# Patient Record
Sex: MALE | Race: WHITE | Employment: OTHER | URBAN - METROPOLITAN AREA
[De-identification: names, ages, dates, MRNs, and addresses within clinical notes are randomized per-mention and may not be internally consistent; named-entity substitution may affect disease eponyms.]

---

## 2017-01-02 ENCOUNTER — HOSPITAL ENCOUNTER (OUTPATIENT)
Dept: LAB | Age: 72
Discharge: HOME | End: 2017-01-02
Attending: INTERNAL MEDICINE | Admitting: INTERNAL MEDICINE

## 2017-01-02 LAB
ANION GAP SERPL CALCULATED.3IONS-SCNC: 15 MMOL/L (ref 4–16)
BUN BLDV-MCNC: 25 MG/DL (ref 6–23)
CALCIUM SERPL-MCNC: 9.6 MG/DL (ref 8.3–10.6)
CHLORIDE BLD-SCNC: 96 MMOL/L (ref 99–110)
CO2: 27 MMOL/L (ref 21–32)
CREAT SERPL-MCNC: 0.9 MG/DL (ref 0.9–1.3)
ESTIMATED AVERAGE GLUCOSE: 171 MG/DL
GFR AFRICAN AMERICAN: >60 ML/MIN/1.73M2
GFR NON-AFRICAN AMERICAN: >60 ML/MIN/1.73M2
GLUCOSE BLD-MCNC: 196 MG/DL (ref 70–140)
HBA1C MFR BLD: 7.6 % (ref 4.2–6.3)
POTASSIUM SERPL-SCNC: 4.3 MMOL/L (ref 3.5–5.1)
SODIUM BLD-SCNC: 138 MMOL/L (ref 135–145)

## 2017-03-27 ENCOUNTER — HOSPITAL ENCOUNTER (OUTPATIENT)
Dept: GENERAL RADIOLOGY | Age: 72
Discharge: HOME | End: 2017-03-27
Attending: INTERNAL MEDICINE | Admitting: INTERNAL MEDICINE

## 2017-03-27 DIAGNOSIS — R13.10 DYSPHAGIA, UNSPECIFIED TYPE: ICD-10-CM

## 2017-04-03 ENCOUNTER — HOSPITAL ENCOUNTER (OUTPATIENT)
Dept: GENERAL RADIOLOGY | Age: 72
Discharge: HOME | End: 2017-04-03
Attending: INTERNAL MEDICINE | Admitting: INTERNAL MEDICINE

## 2017-04-03 LAB
ALBUMIN SERPL-MCNC: 4.6 GM/DL (ref 3.4–5)
ALP BLD-CCNC: 66 IU/L (ref 40–128)
ALT SERPL-CCNC: 59 U/L (ref 10–40)
ANION GAP SERPL CALCULATED.3IONS-SCNC: 13 MMOL/L (ref 4–16)
AST SERPL-CCNC: 28 IU/L (ref 15–37)
BASOPHILS ABSOLUTE: 0.1 K/CU MM
BASOPHILS RELATIVE PERCENT: 1.8 % (ref 0–1)
BILIRUB SERPL-MCNC: 0.8 MG/DL (ref 0–1)
BUN BLDV-MCNC: 18 MG/DL (ref 6–23)
CALCIUM SERPL-MCNC: 10 MG/DL (ref 8.3–10.6)
CHLORIDE BLD-SCNC: 101 MMOL/L (ref 99–110)
CHOLESTEROL: 127 MG/DL
CO2: 27 MMOL/L (ref 21–32)
CREAT SERPL-MCNC: 1 MG/DL (ref 0.9–1.3)
DIFFERENTIAL TYPE: ABNORMAL
EOSINOPHILS ABSOLUTE: 0.5 K/CU MM
EOSINOPHILS RELATIVE PERCENT: 7.7 % (ref 0–3)
ESTIMATED AVERAGE GLUCOSE: 148 MG/DL
GFR AFRICAN AMERICAN: >60 ML/MIN/1.73M2
GFR NON-AFRICAN AMERICAN: >60 ML/MIN/1.73M2
GLUCOSE BLD-MCNC: 135 MG/DL (ref 70–140)
HBA1C MFR BLD: 6.8 % (ref 4.2–6.3)
HCT VFR BLD CALC: 54 % (ref 42–52)
HDLC SERPL-MCNC: 33 MG/DL
HEMOGLOBIN: 18 GM/DL (ref 13.5–18)
IMMATURE NEUTROPHIL %: 0.3 % (ref 0–0.43)
LDL CHOLESTEROL DIRECT: 72 MG/DL
LYMPHOCYTES ABSOLUTE: 1.5 K/CU MM
LYMPHOCYTES RELATIVE PERCENT: 22.2 % (ref 24–44)
MCH RBC QN AUTO: 33.2 PG (ref 27–31)
MCHC RBC AUTO-ENTMCNC: 33.3 % (ref 32–36)
MCV RBC AUTO: 99.6 FL (ref 78–100)
MONOCYTES ABSOLUTE: 0.6 K/CU MM
MONOCYTES RELATIVE PERCENT: 8.5 % (ref 0–4)
NUCLEATED RBC %: 0 %
PDW BLD-RTO: 13.2 % (ref 11.7–14.9)
PLATELET # BLD: 237 K/CU MM (ref 140–440)
PMV BLD AUTO: 10.2 FL (ref 7.5–11.1)
POTASSIUM SERPL-SCNC: 4.9 MMOL/L (ref 3.5–5.1)
RBC # BLD: 5.42 M/CU MM (ref 4.6–6.2)
SEGMENTED NEUTROPHILS ABSOLUTE COUNT: 4.1 K/CU MM
SEGMENTED NEUTROPHILS RELATIVE PERCENT: 59.5 % (ref 36–66)
SODIUM BLD-SCNC: 141 MMOL/L (ref 135–145)
TOTAL IMMATURE NEUTOROPHIL: 0.02 K/CU MM
TOTAL NUCLEATED RBC: 0 K/CU MM
TOTAL PROTEIN: 7.2 GM/DL (ref 6.4–8.2)
TRIGL SERPL-MCNC: 176 MG/DL
WBC # BLD: 6.8 K/CU MM (ref 4–10.5)

## 2017-05-03 ENCOUNTER — HOSPITAL ENCOUNTER (OUTPATIENT)
Dept: SURGERY | Age: 72
Discharge: HOME | End: 2017-05-03
Attending: INTERNAL MEDICINE | Admitting: INTERNAL MEDICINE

## 2017-05-03 VITALS
RESPIRATION RATE: 16 BRPM | WEIGHT: 207 LBS | TEMPERATURE: 98.3 F | DIASTOLIC BLOOD PRESSURE: 79 MMHG | HEIGHT: 68 IN | BODY MASS INDEX: 31.37 KG/M2 | OXYGEN SATURATION: 95 % | SYSTOLIC BLOOD PRESSURE: 127 MMHG | HEART RATE: 86 BPM

## 2017-05-03 LAB — GLUCOSE BLD-MCNC: 129 MG/DL (ref 70–99)

## 2017-05-03 RX ORDER — SODIUM CHLORIDE, SODIUM LACTATE, POTASSIUM CHLORIDE, CALCIUM CHLORIDE 600; 310; 30; 20 MG/100ML; MG/100ML; MG/100ML; MG/100ML
INJECTION, SOLUTION INTRAVENOUS CONTINUOUS
Status: DISCONTINUED | OUTPATIENT
Start: 2017-05-03 | End: 2017-05-04 | Stop reason: HOSPADM

## 2017-05-03 RX ADMIN — SODIUM CHLORIDE, SODIUM LACTATE, POTASSIUM CHLORIDE, CALCIUM CHLORIDE: 600; 310; 30; 20 INJECTION, SOLUTION INTRAVENOUS at 10:14

## 2017-05-03 ASSESSMENT — PAIN SCALES - GENERAL
PAINLEVEL_OUTOF10: 0
PAINLEVEL_OUTOF10: 0

## 2017-05-03 ASSESSMENT — PAIN - FUNCTIONAL ASSESSMENT: PAIN_FUNCTIONAL_ASSESSMENT: 0-10

## 2017-09-29 ENCOUNTER — HOSPITAL ENCOUNTER (OUTPATIENT)
Dept: GENERAL RADIOLOGY | Age: 72
Discharge: HOME | End: 2017-09-29
Attending: INTERNAL MEDICINE | Admitting: INTERNAL MEDICINE

## 2017-09-29 LAB
ALBUMIN SERPL-MCNC: 4.5 GM/DL (ref 3.4–5)
ALP BLD-CCNC: 78 IU/L (ref 40–128)
ALT SERPL-CCNC: 74 U/L (ref 10–40)
ANION GAP SERPL CALCULATED.3IONS-SCNC: 15 MMOL/L (ref 4–16)
AST SERPL-CCNC: 42 IU/L (ref 15–37)
BILIRUB SERPL-MCNC: 0.8 MG/DL (ref 0–1)
BUN BLDV-MCNC: 19 MG/DL (ref 6–23)
CALCIUM SERPL-MCNC: 9.7 MG/DL (ref 8.3–10.6)
CHLORIDE BLD-SCNC: 99 MMOL/L (ref 99–110)
CHOLESTEROL: 131 MG/DL
CO2: 25 MMOL/L (ref 21–32)
CREAT SERPL-MCNC: 1 MG/DL (ref 0.9–1.3)
ESTIMATED AVERAGE GLUCOSE: 148 MG/DL
GFR AFRICAN AMERICAN: >60 ML/MIN/1.73M2
GFR NON-AFRICAN AMERICAN: >60 ML/MIN/1.73M2
GLUCOSE FASTING: 142 MG/DL (ref 70–99)
HBA1C MFR BLD: 6.8 % (ref 4.2–6.3)
HDLC SERPL-MCNC: 32 MG/DL
LDL CHOLESTEROL DIRECT: 72 MG/DL
POTASSIUM SERPL-SCNC: 4.6 MMOL/L (ref 3.5–5.1)
PROSTATE SPECIFIC ANTIGEN: 0.93 NG/ML (ref 0–4)
SODIUM BLD-SCNC: 139 MMOL/L (ref 135–145)
TOTAL PROTEIN: 7.2 GM/DL (ref 6.4–8.2)
TRIGL SERPL-MCNC: 168 MG/DL
TSH HIGH SENSITIVITY: 1.98 UIU/ML (ref 0.27–4.2)

## 2017-10-09 ENCOUNTER — TELEPHONE (OUTPATIENT)
Dept: CARDIOLOGY CLINIC | Age: 72
End: 2017-10-09

## 2017-10-09 DIAGNOSIS — R07.9 CHEST PAIN, UNSPECIFIED TYPE: Primary | ICD-10-CM

## 2017-10-09 NOTE — TELEPHONE ENCOUNTER
Orders placed per CPACS for Cardiolite for chest pain ordered  by Dr Delacruz Poles  see orders scanned in media.

## 2017-10-11 ENCOUNTER — PROCEDURE VISIT (OUTPATIENT)
Dept: CARDIOLOGY CLINIC | Age: 72
End: 2017-10-11

## 2017-10-11 DIAGNOSIS — R07.9 CHEST PAIN, UNSPECIFIED TYPE: ICD-10-CM

## 2017-10-11 LAB
LV EF: 67 %
LVEF MODALITY: NORMAL

## 2017-10-11 PROCEDURE — 93015 CV STRESS TEST SUPVJ I&R: CPT | Performed by: INTERNAL MEDICINE

## 2017-10-11 PROCEDURE — 78452 HT MUSCLE IMAGE SPECT MULT: CPT | Performed by: INTERNAL MEDICINE

## 2017-10-11 PROCEDURE — A9500 TC99M SESTAMIBI: HCPCS | Performed by: INTERNAL MEDICINE

## 2017-11-09 PROBLEM — R79.89 ELEVATED TROPONIN: Status: ACTIVE | Noted: 2017-11-09

## 2017-11-09 PROBLEM — D75.1 ERYTHROCYTOSIS: Status: ACTIVE | Noted: 2017-11-09

## 2017-11-09 PROBLEM — I10 HYPERTENSION: Status: ACTIVE | Noted: 2017-11-09

## 2017-11-09 PROBLEM — R77.8 ELEVATED TROPONIN: Status: ACTIVE | Noted: 2017-11-09

## 2017-11-10 PROBLEM — I24.0 OCCLUSION OF LAD (LEFT ANTERIOR DESCENDING) ARTERY (HCC): Status: ACTIVE | Noted: 2017-11-10

## 2017-11-20 ENCOUNTER — OFFICE VISIT (OUTPATIENT)
Dept: CARDIOLOGY CLINIC | Age: 72
End: 2017-11-20

## 2017-11-20 VITALS
HEIGHT: 68 IN | SYSTOLIC BLOOD PRESSURE: 110 MMHG | HEART RATE: 74 BPM | BODY MASS INDEX: 31.46 KG/M2 | WEIGHT: 207.6 LBS | DIASTOLIC BLOOD PRESSURE: 60 MMHG

## 2017-11-20 DIAGNOSIS — R07.9 CHEST PAIN, UNSPECIFIED TYPE: Primary | ICD-10-CM

## 2017-11-20 DIAGNOSIS — Z98.61 S/P PTCA (PERCUTANEOUS TRANSLUMINAL CORONARY ANGIOPLASTY): ICD-10-CM

## 2017-11-20 PROCEDURE — 99214 OFFICE O/P EST MOD 30 MIN: CPT | Performed by: INTERNAL MEDICINE

## 2017-11-20 PROCEDURE — 93000 ELECTROCARDIOGRAM COMPLETE: CPT | Performed by: INTERNAL MEDICINE

## 2017-11-20 RX ORDER — LEVOTHYROXINE SODIUM 100 UG/1
CAPSULE ORAL DAILY
COMMUNITY
End: 2020-04-20

## 2017-11-20 NOTE — PROGRESS NOTES
Radha Gomez MD        OFFICE  FOLLOWUP NOTE    Chief complaints: patient is here for management of CAD, DM, HTN, DYSLPIDEMIA    Subjective: patient feels better, no chest pain, no shortness of breath, no dizziness, no palpitations    HPI Tania Huerta is a 80 y. o.year old who  has a past medical history of Diabetes mellitus ([facility] [address] Hypertension; and 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 ([facility] [address] and presents for management of CAD, DM, HTN, DYSLPIDEMIA, which are well controlled, he had NSTEMI ( IT %mid LAD, S/P URGENT PCI of LAD),       Current Outpatient Prescriptions   Medication Sig Dispense Refill    linagliptin (TRADJENTA) 5 MG tablet Take 5 mg by mouth daily      Levothyroxine Sodium 100 MCG CAPS Take by mouth Daily      canagliflozin (INVOKANA) 300 MG TABS tablet Take 300 mg by mouth every morning (before breakfast)      aspirin 325 MG tablet Take 1 tablet by mouth daily 30 tablet 3    metoprolol succinate (TOPROL XL) 25 MG extended release tablet Take 1 tablet by mouth daily 30 tablet 3    prasugrel (EFFIENT) 10 MG TABS Take 1 tablet by mouth daily 30 tablet 3    atorvastatin (LIPITOR) 80 MG tablet Take 1 tablet by mouth daily 30 tablet 3    glimepiride (AMARYL) 4 MG tablet Take 4 mg by mouth every morning (before breakfast).  lisinopril (PRINIVIL;ZESTRIL) 5 MG tablet Take 5 mg by mouth daily.  METFORMIN HCL PO Take 1,000 mg by mouth 2 times daily. No current facility-administered medications for this visit. Allergies: Review of patient's allergies indicates no known allergies.   Past Medical History:   Diagnosis Date    Diabetes mellitus ([facility] [address]     Hypertension     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 11/10/2017     Past Surgical History:   Procedure Laterality Date    BACK SURGERY      X 2    COLONOSCOPY  4/3/14    divertics, rectal polyps    ENDOSCOPY, COLON, DIAGNOSTIC      ENDOSCOPY, COLON, DIAGNOSTIC  05/03/2017 No wheezing, No chest tenderness. ,no use of accessory muscles, diaphragm movement is normal  Cardiovascular: (PMI) apex non displaced,no lifts no thrills, no s3,no s4, Normal heart rate, Normal rhythm, No murmurs, No rubs, No gallops. Carotid arteries pulse and amplitude are normal no bruit, no abdominal bruit noted ( normal abdominal aorta ausculation), femoral arteries pulse and amplitude are normal no bruit, pedal pulses are normal  Femoral pulses have normal amplitude, no bruits   Extremities - No cyanosis, clubbing, or significant edema, no varicose veins    Abdomen  No masses, tenderness, or organomegaly, no hepato-splenomegally, no bruits  Musculoskeletal  No significant edema, no kyphosis or scoliosis, no deformity in any extremity noted, muscle strength and tone are normal  Skin: no ulcer,no scar,no stasis dermatitis   Neurologic  alert oriented times 3,Cranial nerves II through XII are grossly intact. There were no gross focal neurologic abnormalities. All sensory and motor nerves examined and were normal  Psychiatric: normal mood and affect    Lab Results   Component Value Date    TROPONINI <0.006 02/12/2014     BNP:  No results found for: BNP  PT/INR:  No results found for: [employer]  Lab Results   Component Value Date    LABA1C 7.0 (H) 11/10/2017    LABA1C 6.8 (H) 09/29/2017     Lab Results   Component Value Date    CHOL 131 11/10/2017    TRIG 258 (H) 11/10/2017    HDL 29 (L) 11/10/2017    LDLDIRECT 72 11/10/2017     Lab Results   Component Value Date    ALT 77 (H) 11/09/2017    AST 42 (H) 11/09/2017     TSH:  No results found for: TSH    Impression:  Sonia Sequeira is a 80 y. o.year old who  has a past medical history of Diabetes mellitus ([facility] [address] Hypertension; and 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 ([facility] [address] and presents with     Plan:  1. CAD: Continue aspirin and EFFEINT, continue statins, ACEinhibitors, betablockers  2. S/P PCI OF LAD, start cardiac rehab program , echo and stress test  3.  DM: stable continue metformin  4. Dyslipidemia: continue statins  5. HTN: stable, continue lopressor and lisinopril medicatons  6. Health maintenance: exerise and diet  All labs, medications and tests reviewed, continue all other medications of all above medical condition listed as is.     @[facility]@

## 2017-11-21 ENCOUNTER — TELEPHONE (OUTPATIENT)
Dept: CARDIOLOGY CLINIC | Age: 72
End: 2017-11-21

## 2017-11-30 ENCOUNTER — PROCEDURE VISIT (OUTPATIENT)
Dept: CARDIOLOGY CLINIC | Age: 72
End: 2017-11-30

## 2017-11-30 DIAGNOSIS — Z98.61 S/P PTCA (PERCUTANEOUS TRANSLUMINAL CORONARY ANGIOPLASTY): ICD-10-CM

## 2017-11-30 DIAGNOSIS — R07.9 CHEST PAIN, UNSPECIFIED TYPE: ICD-10-CM

## 2017-11-30 DIAGNOSIS — Z98.61 S/P PTCA (PERCUTANEOUS TRANSLUMINAL CORONARY ANGIOPLASTY): Primary | ICD-10-CM

## 2017-11-30 DIAGNOSIS — R07.9 CHEST PAIN, UNSPECIFIED TYPE: Primary | ICD-10-CM

## 2017-11-30 LAB
LV EF: 55 %
LVEF MODALITY: NORMAL

## 2017-11-30 PROCEDURE — 93015 CV STRESS TEST SUPVJ I&R: CPT | Performed by: INTERNAL MEDICINE

## 2017-11-30 PROCEDURE — 93306 TTE W/DOPPLER COMPLETE: CPT | Performed by: INTERNAL MEDICINE

## 2017-12-01 ENCOUNTER — TELEPHONE (OUTPATIENT)
Dept: CARDIOLOGY CLINIC | Age: 72
End: 2017-12-01

## 2017-12-01 NOTE — TELEPHONE ENCOUNTER
Normal left ventricle structure and systolic function. Ejection fraction is   visually estimated at 50-60%. Grade I diastolic dysfunction.   No significant valvular disease noted.   No evidence of pericardial effusion.   Essentially unremarkable echo.   Advised pt of normal echo.

## 2017-12-15 ENCOUNTER — HOSPITAL ENCOUNTER (OUTPATIENT)
Dept: OTHER | Age: 72
Discharge: HOME | End: 2017-12-31
Attending: INTERNAL MEDICINE | Admitting: INTERNAL MEDICINE

## 2017-12-26 LAB
GLUCOSE BLD-MCNC: 143 MG/DL (ref 70–99)
GLUCOSE BLD-MCNC: 176 MG/DL (ref 70–99)

## 2017-12-28 LAB
GLUCOSE BLD-MCNC: 129 MG/DL (ref 70–99)
GLUCOSE BLD-MCNC: 162 MG/DL (ref 70–99)

## 2018-01-01 ENCOUNTER — HOSPITAL ENCOUNTER (OUTPATIENT)
Dept: OTHER | Age: 73
Discharge: HOME | End: 2018-01-31
Attending: INTERNAL MEDICINE | Admitting: INTERNAL MEDICINE

## 2018-01-04 LAB
GLUCOSE BLD-MCNC: 149 MG/DL (ref 70–99)
GLUCOSE BLD-MCNC: 158 MG/DL (ref 70–99)

## 2018-02-01 ENCOUNTER — HOSPITAL ENCOUNTER (OUTPATIENT)
Dept: OTHER | Age: 73
Discharge: HOME | End: 2018-02-28
Attending: INTERNAL MEDICINE | Admitting: INTERNAL MEDICINE

## 2018-03-05 ENCOUNTER — OFFICE VISIT (OUTPATIENT)
Dept: CARDIOLOGY CLINIC | Age: 73
End: 2018-03-05

## 2018-03-05 VITALS
HEIGHT: 68 IN | BODY MASS INDEX: 31.61 KG/M2 | SYSTOLIC BLOOD PRESSURE: 112 MMHG | WEIGHT: 208.6 LBS | DIASTOLIC BLOOD PRESSURE: 70 MMHG | HEART RATE: 80 BPM

## 2018-03-05 DIAGNOSIS — I25.10 CORONARY ARTERY DISEASE INVOLVING NATIVE CORONARY ARTERY OF NATIVE HEART WITHOUT ANGINA PECTORIS: Primary | ICD-10-CM

## 2018-03-05 PROCEDURE — 99214 OFFICE O/P EST MOD 30 MIN: CPT | Performed by: INTERNAL MEDICINE

## 2018-03-05 RX ORDER — PRASUGREL 10 MG/1
10 TABLET, FILM COATED ORAL DAILY
Qty: 90 TABLET | Refills: 3 | Status: SHIPPED | OUTPATIENT
Start: 2018-03-05 | End: 2019-02-11 | Stop reason: SDUPTHER

## 2018-03-05 RX ORDER — LISINOPRIL 5 MG/1
5 TABLET ORAL DAILY
Qty: 90 TABLET | Refills: 3 | Status: SHIPPED | OUTPATIENT
Start: 2018-03-05

## 2018-03-05 RX ORDER — METOPROLOL SUCCINATE 25 MG/1
25 TABLET, EXTENDED RELEASE ORAL DAILY
Qty: 90 TABLET | Refills: 3 | Status: SHIPPED | OUTPATIENT
Start: 2018-03-05 | End: 2019-02-11 | Stop reason: SDUPTHER

## 2018-03-05 NOTE — PROGRESS NOTES
Supple. No jugular venous distention noted. No carotid bruits, no apical -carotid delay  Respiratory:  Normal breath sounds, No respiratory distress, No wheezing, No chest tenderness. ,no use of accessory muscles, diaphragm movement is normal  Cardiovascular: (PMI) apex non displaced,no lifts no thrills, no s3,no s4, Normal heart rate, Normal rhythm, No murmurs, No rubs, No gallops. Carotid arteries pulse and amplitude are normal no bruit, no abdominal bruit noted ( normal abdominal aorta ausculation), femoral arteries pulse and amplitude are normal no bruit, pedal pulses are normal  Femoral pulses have normal amplitude, no bruits   Extremities - No cyanosis, clubbing, or significant edema, no varicose veins    Abdomen  No masses, tenderness, or organomegaly, no hepato-splenomegally, no bruits  Musculoskeletal  No significant edema, no kyphosis or scoliosis, no deformity in any extremity noted, muscle strength and tone are normal  Skin: no ulcer,no scar,no stasis dermatitis   Neurologic  alert oriented times 3,Cranial nerves II through XII are grossly intact. There were no gross focal neurologic abnormalities. All sensory and motor nerves examined and were normal  Psychiatric: normal mood and affect    Lab Results   Component Value Date    TROPONINI <0.006 02/12/2014     BNP:  No results found for: BNP  PT/INR:  No results found for: [employer]  Lab Results   Component Value Date    LABA1C 7.0 (H) 11/10/2017    LABA1C 6.8 (H) 09/29/2017     Lab Results   Component Value Date    CHOL 131 11/10/2017    TRIG 258 (H) 11/10/2017    HDL 29 (L) 11/10/2017    LDLDIRECT 72 11/10/2017     Lab Results   Component Value Date    ALT 77 (H) 11/09/2017    AST 42 (H) 11/09/2017     TSH:  No results found for: TSH    Impression:  Phillip Peguero is a 71 y. o.year old who  has a past medical history of Diabetes mellitus ([address]  History of exercise stress test; Hx of Doppler echocardiogram; Hypertension; and 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 ([facility] [address] and presents with     Plan:  1. CAD: switch to baby aspirin and continue  EFFEINT, continue statins, ACEinhibitors, betablockers  2. S/P PCI OF LAD, completed part of  cardiac rehab program  3. DM: stable continue metformin  4. Dyslipidemia: continue statins  5. HTN: stable, continue lopressor and lisinopril  All labs, medications and tests reviewed, continue all other medications of all above medical condition listed as is.     [unfilled]

## 2018-04-12 PROBLEM — R77.8 ELEVATED TROPONIN: Status: RESOLVED | Noted: 2017-11-09 | Resolved: 2018-04-12

## 2018-04-12 PROBLEM — R79.89 ELEVATED TROPONIN: Status: RESOLVED | Noted: 2017-11-09 | Resolved: 2018-04-12

## 2018-07-09 ENCOUNTER — HOSPITAL ENCOUNTER (OUTPATIENT)
Dept: GENERAL RADIOLOGY | Age: 73
Discharge: HOME | End: 2018-07-09
Attending: INTERNAL MEDICINE | Admitting: INTERNAL MEDICINE

## 2018-07-09 LAB
ALBUMIN SERPL-MCNC: 4.7 GM/DL (ref 3.4–5)
ALP BLD-CCNC: 75 IU/L (ref 40–128)
ALT SERPL-CCNC: 74 U/L (ref 10–40)
ANION GAP SERPL CALCULATED.3IONS-SCNC: 21 MMOL/L (ref 4–16)
AST SERPL-CCNC: 47 IU/L (ref 15–37)
BILIRUB SERPL-MCNC: 0.8 MG/DL (ref 0–1)
BUN BLDV-MCNC: 16 MG/DL (ref 6–23)
CALCIUM SERPL-MCNC: 10.1 MG/DL (ref 8.3–10.6)
CHLORIDE BLD-SCNC: 101 MMOL/L (ref 99–110)
CHOLESTEROL: 99 MG/DL
CO2: 22 MMOL/L (ref 21–32)
CREAT SERPL-MCNC: 0.9 MG/DL (ref 0.9–1.3)
ESTIMATED AVERAGE GLUCOSE: 169 MG/DL
GFR AFRICAN AMERICAN: >60 ML/MIN/1.73M2
GFR NON-AFRICAN AMERICAN: >60 ML/MIN/1.73M2
GLUCOSE FASTING: 118 MG/DL (ref 70–99)
HBA1C MFR BLD: 7.5 % (ref 4.2–6.3)
HDLC SERPL-MCNC: 39 MG/DL
LDL CHOLESTEROL DIRECT: 50 MG/DL
POTASSIUM SERPL-SCNC: 4.5 MMOL/L (ref 3.5–5.1)
SODIUM BLD-SCNC: 144 MMOL/L (ref 135–145)
TOTAL PROTEIN: 7.4 GM/DL (ref 6.4–8.2)
TRIGL SERPL-MCNC: 127 MG/DL

## 2018-07-26 ENCOUNTER — PROCEDURE VISIT (OUTPATIENT)
Dept: CARDIOLOGY CLINIC | Age: 73
End: 2018-07-26

## 2018-07-26 DIAGNOSIS — E08.59: Primary | ICD-10-CM

## 2018-07-26 PROCEDURE — 93922 UPR/L XTREMITY ART 2 LEVELS: CPT | Performed by: INTERNAL MEDICINE

## 2018-07-26 PROCEDURE — 93925 LOWER EXTREMITY STUDY: CPT | Performed by: INTERNAL MEDICINE

## 2018-08-08 ENCOUNTER — TELEPHONE (OUTPATIENT)
Dept: CARDIOLOGY CLINIC | Age: 73
End: 2018-08-08

## 2018-09-10 ENCOUNTER — OFFICE VISIT (OUTPATIENT)
Dept: CARDIOLOGY CLINIC | Age: 73
End: 2018-09-10

## 2018-09-10 VITALS
WEIGHT: 212.2 LBS | SYSTOLIC BLOOD PRESSURE: 122 MMHG | DIASTOLIC BLOOD PRESSURE: 62 MMHG | HEIGHT: 68 IN | BODY MASS INDEX: 32.16 KG/M2 | HEART RATE: 78 BPM

## 2018-09-10 DIAGNOSIS — I25.10 CORONARY ARTERY DISEASE INVOLVING NATIVE CORONARY ARTERY OF NATIVE HEART WITHOUT ANGINA PECTORIS: Primary | ICD-10-CM

## 2018-09-10 DIAGNOSIS — I10 ESSENTIAL HYPERTENSION: ICD-10-CM

## 2018-09-10 DIAGNOSIS — E78.5 DYSLIPIDEMIA: ICD-10-CM

## 2018-09-10 PROCEDURE — 99214 OFFICE O/P EST MOD 30 MIN: CPT | Performed by: INTERNAL MEDICINE

## 2018-09-10 NOTE — PROGRESS NOTES
07/09/2018    AST 47 (H) 07/09/2018     TSH:  No results found for: TSH    Impression:  Samie Lombard is a 71 y. o.year old who  has a past medical history of Diabetes mellitus ([facility] [address] History of Doppler ultrasound; History of exercise stress test; Hx of Doppler echocardiogram; Hypertension; and 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 ([facility] [address] and presents with     Plan:  1. CAD: continue baby aspirin and continue  EFFEINT, continue statins, ACEinhibitors, betablockers  2. S/P PCI OF LAD  3. brusies on hand: from effient, will continue effient and avoid trauma  4. DM: stable continue metformin  5. Dyslipidemia: continue statins  1. HTN: stable, continue lopressor and lisinoprilHealth maintenance: exerise and diet  All labs, medications and tests reviewed, continue all other medications of all above medical condition listed as is.

## 2018-11-12 ENCOUNTER — HOSPITAL ENCOUNTER (OUTPATIENT)
Age: 73
Discharge: HOME | End: 2018-11-12
Payer: MEDICARE

## 2018-11-12 LAB
ANION GAP SERPL CALCULATED.3IONS-SCNC: 14 MMOL/L (ref 4–16)
BUN BLDV-MCNC: 21 MG/DL (ref 6–23)
CALCIUM SERPL-MCNC: 9.7 MG/DL (ref 8.3–10.6)
CHLORIDE BLD-SCNC: 102 MMOL/L (ref 99–110)
CO2: 24 MMOL/L (ref 21–32)
CREAT SERPL-MCNC: 1 MG/DL (ref 0.9–1.3)
ESTIMATED AVERAGE GLUCOSE: 140 MG/DL
GFR AFRICAN AMERICAN: >60 ML/MIN/1.73M2
GFR NON-AFRICAN AMERICAN: >60 ML/MIN/1.73M2
GLUCOSE BLD-MCNC: 107 MG/DL (ref 70–99)
HBA1C MFR BLD: 6.5 % (ref 4.2–6.3)
POTASSIUM SERPL-SCNC: 4.6 MMOL/L (ref 3.5–5.1)
SODIUM BLD-SCNC: 140 MMOL/L (ref 135–145)

## 2018-11-12 PROCEDURE — 83036 HEMOGLOBIN GLYCOSYLATED A1C: CPT

## 2018-11-12 PROCEDURE — 36415 COLL VENOUS BLD VENIPUNCTURE: CPT

## 2018-11-12 PROCEDURE — 80048 BASIC METABOLIC PNL TOTAL CA: CPT

## 2019-02-09 ENCOUNTER — HOSPITAL ENCOUNTER (OUTPATIENT)
Age: 74
Discharge: HOME | End: 2019-02-09
Payer: MEDICARE

## 2019-02-09 LAB
ALBUMIN SERPL-MCNC: 4.6 GM/DL (ref 3.4–5)
ALP BLD-CCNC: 73 IU/L (ref 40–128)
ALT SERPL-CCNC: 67 U/L (ref 10–40)
ANION GAP SERPL CALCULATED.3IONS-SCNC: 11 MMOL/L (ref 4–16)
AST SERPL-CCNC: 49 IU/L (ref 15–37)
BASOPHILS ABSOLUTE: 0.1 K/CU MM
BASOPHILS RELATIVE PERCENT: 1.4 % (ref 0–1)
BILIRUB SERPL-MCNC: 0.8 MG/DL (ref 0–1)
BUN BLDV-MCNC: 18 MG/DL (ref 6–23)
CALCIUM SERPL-MCNC: 9.7 MG/DL (ref 8.3–10.6)
CHLORIDE BLD-SCNC: 102 MMOL/L (ref 99–110)
CHOLESTEROL: 105 MG/DL
CO2: 26 MMOL/L (ref 21–32)
CREAT SERPL-MCNC: 1.1 MG/DL (ref 0.9–1.3)
DIFFERENTIAL TYPE: ABNORMAL
EOSINOPHILS ABSOLUTE: 0.4 K/CU MM
EOSINOPHILS RELATIVE PERCENT: 5.4 % (ref 0–3)
ESTIMATED AVERAGE GLUCOSE: 148 MG/DL
GFR AFRICAN AMERICAN: >60 ML/MIN/1.73M2
GFR NON-AFRICAN AMERICAN: >60 ML/MIN/1.73M2
GLUCOSE BLD-MCNC: 121 MG/DL (ref 70–99)
HBA1C MFR BLD: 6.8 % (ref 4.2–6.3)
HCT VFR BLD CALC: 53.6 % (ref 42–52)
HDLC SERPL-MCNC: 33 MG/DL
HEMOGLOBIN: 17.3 GM/DL (ref 13.5–18)
IMMATURE NEUTROPHIL %: 0.3 % (ref 0–0.43)
LDL CHOLESTEROL CALCULATED: 43 MG/DL
LYMPHOCYTES ABSOLUTE: 1.1 K/CU MM
LYMPHOCYTES RELATIVE PERCENT: 17.5 % (ref 24–44)
MCH RBC QN AUTO: 33.3 PG (ref 27–31)
MCHC RBC AUTO-ENTMCNC: 32.3 % (ref 32–36)
MCV RBC AUTO: 103.1 FL (ref 78–100)
MONOCYTES ABSOLUTE: 0.5 K/CU MM
MONOCYTES RELATIVE PERCENT: 8.1 % (ref 0–4)
NUCLEATED RBC %: 0 %
PDW BLD-RTO: 13.2 % (ref 11.7–14.9)
PLATELET # BLD: 207 K/CU MM (ref 140–440)
PMV BLD AUTO: 10.1 FL (ref 7.5–11.1)
POTASSIUM SERPL-SCNC: 4.6 MMOL/L (ref 3.5–5.1)
RBC # BLD: 5.2 M/CU MM (ref 4.6–6.2)
SEGMENTED NEUTROPHILS ABSOLUTE COUNT: 4.4 K/CU MM
SEGMENTED NEUTROPHILS RELATIVE PERCENT: 67.3 % (ref 36–66)
SODIUM BLD-SCNC: 139 MMOL/L (ref 135–145)
TOTAL IMMATURE NEUTOROPHIL: 0.02 K/CU MM
TOTAL NUCLEATED RBC: 0 K/CU MM
TOTAL PROTEIN: 6.9 GM/DL (ref 6.4–8.2)
TRIGL SERPL-MCNC: 147 MG/DL
WBC # BLD: 6.5 K/CU MM (ref 4–10.5)

## 2019-02-09 PROCEDURE — 80053 COMPREHEN METABOLIC PANEL: CPT

## 2019-02-09 PROCEDURE — 85025 COMPLETE CBC W/AUTO DIFF WBC: CPT

## 2019-02-09 PROCEDURE — 36415 COLL VENOUS BLD VENIPUNCTURE: CPT

## 2019-02-09 PROCEDURE — 80061 LIPID PANEL: CPT

## 2019-02-09 PROCEDURE — 83036 HEMOGLOBIN GLYCOSYLATED A1C: CPT

## 2019-02-11 DIAGNOSIS — I25.10 CORONARY ARTERY DISEASE INVOLVING NATIVE CORONARY ARTERY OF NATIVE HEART WITHOUT ANGINA PECTORIS: ICD-10-CM

## 2019-02-11 RX ORDER — PRASUGREL 10 MG/1
10 TABLET, FILM COATED ORAL DAILY
Qty: 90 TABLET | Refills: 3 | Status: SHIPPED | OUTPATIENT
Start: 2019-02-11 | End: 2020-04-20

## 2019-02-11 RX ORDER — METOPROLOL SUCCINATE 25 MG/1
25 TABLET, EXTENDED RELEASE ORAL DAILY
Qty: 90 TABLET | Refills: 3 | Status: SHIPPED | OUTPATIENT
Start: 2019-02-11 | End: 2020-04-20

## 2019-03-18 ENCOUNTER — OFFICE VISIT (OUTPATIENT)
Dept: CARDIOLOGY CLINIC | Age: 74
End: 2019-03-18
Payer: MEDICARE

## 2019-03-18 VITALS
HEART RATE: 60 BPM | SYSTOLIC BLOOD PRESSURE: 104 MMHG | DIASTOLIC BLOOD PRESSURE: 80 MMHG | BODY MASS INDEX: 31.43 KG/M2 | HEIGHT: 68 IN | WEIGHT: 207.4 LBS

## 2019-03-18 DIAGNOSIS — I25.10 CORONARY ARTERY DISEASE INVOLVING NATIVE CORONARY ARTERY OF NATIVE HEART WITHOUT ANGINA PECTORIS: Primary | ICD-10-CM

## 2019-03-18 PROCEDURE — 99214 OFFICE O/P EST MOD 30 MIN: CPT | Performed by: INTERNAL MEDICINE

## 2019-07-17 ENCOUNTER — HOSPITAL ENCOUNTER (OUTPATIENT)
Age: 74
Discharge: HOME | End: 2019-07-17
Payer: MEDICARE

## 2019-07-17 LAB
ALBUMIN SERPL-MCNC: 4.7 GM/DL (ref 3.4–5)
ALP BLD-CCNC: 80 IU/L (ref 40–128)
ALT SERPL-CCNC: 69 U/L (ref 10–40)
ANION GAP SERPL CALCULATED.3IONS-SCNC: 13 MMOL/L (ref 4–16)
AST SERPL-CCNC: 46 IU/L (ref 15–37)
BASOPHILS ABSOLUTE: 0.1 K/CU MM
BASOPHILS RELATIVE PERCENT: 1.4 % (ref 0–1)
BILIRUB SERPL-MCNC: 0.8 MG/DL (ref 0–1)
BUN BLDV-MCNC: 19 MG/DL (ref 6–23)
CALCIUM SERPL-MCNC: 10 MG/DL (ref 8.3–10.6)
CHLORIDE BLD-SCNC: 100 MMOL/L (ref 99–110)
CHOLESTEROL: 84 MG/DL
CO2: 26 MMOL/L (ref 21–32)
CREAT SERPL-MCNC: 1 MG/DL (ref 0.9–1.3)
DIFFERENTIAL TYPE: ABNORMAL
EOSINOPHILS ABSOLUTE: 0.3 K/CU MM
EOSINOPHILS RELATIVE PERCENT: 5.1 % (ref 0–3)
ESTIMATED AVERAGE GLUCOSE: 160 MG/DL
GFR AFRICAN AMERICAN: >60 ML/MIN/1.73M2
GFR NON-AFRICAN AMERICAN: >60 ML/MIN/1.73M2
GLUCOSE BLD-MCNC: 107 MG/DL (ref 70–99)
HBA1C MFR BLD: 7.2 % (ref 4.2–6.3)
HCT VFR BLD CALC: 51.8 % (ref 42–52)
HDLC SERPL-MCNC: 33 MG/DL
HEMOGLOBIN: 16.6 GM/DL (ref 13.5–18)
IMMATURE NEUTROPHIL %: 0.3 % (ref 0–0.43)
LDL CHOLESTEROL DIRECT: 34 MG/DL
LYMPHOCYTES ABSOLUTE: 1.1 K/CU MM
LYMPHOCYTES RELATIVE PERCENT: 18.2 % (ref 24–44)
MCH RBC QN AUTO: 32.4 PG (ref 27–31)
MCHC RBC AUTO-ENTMCNC: 32 % (ref 32–36)
MCV RBC AUTO: 101 FL (ref 78–100)
MONOCYTES ABSOLUTE: 0.6 K/CU MM
MONOCYTES RELATIVE PERCENT: 10.4 % (ref 0–4)
NUCLEATED RBC %: 0 %
PDW BLD-RTO: 13.5 % (ref 11.7–14.9)
PLATELET # BLD: 220 K/CU MM (ref 140–440)
PMV BLD AUTO: 10.5 FL (ref 7.5–11.1)
POTASSIUM SERPL-SCNC: 4.4 MMOL/L (ref 3.5–5.1)
PROSTATE SPECIFIC ANTIGEN: 0.91 NG/ML (ref 0–4)
RBC # BLD: 5.13 M/CU MM (ref 4.6–6.2)
SEGMENTED NEUTROPHILS ABSOLUTE COUNT: 3.8 K/CU MM
SEGMENTED NEUTROPHILS RELATIVE PERCENT: 64.6 % (ref 36–66)
SODIUM BLD-SCNC: 139 MMOL/L (ref 135–145)
TOTAL IMMATURE NEUTOROPHIL: 0.02 K/CU MM
TOTAL NUCLEATED RBC: 0 K/CU MM
TOTAL PROTEIN: 7.2 GM/DL (ref 6.4–8.2)
TRIGL SERPL-MCNC: 145 MG/DL
TSH HIGH SENSITIVITY: 7.92 UIU/ML (ref 0.27–4.2)
WBC # BLD: 5.9 K/CU MM (ref 4–10.5)

## 2019-07-17 PROCEDURE — 83036 HEMOGLOBIN GLYCOSYLATED A1C: CPT

## 2019-07-17 PROCEDURE — 80053 COMPREHEN METABOLIC PANEL: CPT

## 2019-07-17 PROCEDURE — 84443 ASSAY THYROID STIM HORMONE: CPT

## 2019-07-17 PROCEDURE — G0103 PSA SCREENING: HCPCS

## 2019-07-17 PROCEDURE — 83721 ASSAY OF BLOOD LIPOPROTEIN: CPT

## 2019-07-17 PROCEDURE — 36415 COLL VENOUS BLD VENIPUNCTURE: CPT

## 2019-07-17 PROCEDURE — 80061 LIPID PANEL: CPT

## 2019-07-17 PROCEDURE — 85025 COMPLETE CBC W/AUTO DIFF WBC: CPT

## 2019-09-16 ENCOUNTER — OFFICE VISIT (OUTPATIENT)
Dept: CARDIOLOGY CLINIC | Age: 74
End: 2019-09-16
Payer: MEDICARE

## 2019-09-16 VITALS
WEIGHT: 207.6 LBS | SYSTOLIC BLOOD PRESSURE: 116 MMHG | BODY MASS INDEX: 31.46 KG/M2 | HEART RATE: 76 BPM | DIASTOLIC BLOOD PRESSURE: 70 MMHG | HEIGHT: 68 IN

## 2019-09-16 DIAGNOSIS — I25.10 CORONARY ARTERY DISEASE INVOLVING NATIVE CORONARY ARTERY OF NATIVE HEART WITHOUT ANGINA PECTORIS: Primary | ICD-10-CM

## 2019-09-16 PROCEDURE — 99214 OFFICE O/P EST MOD 30 MIN: CPT | Performed by: INTERNAL MEDICINE

## 2019-09-16 NOTE — PROGRESS NOTES
of pallor or cyanosis  Neck - Supple. No jugular venous distention noted. No carotid bruits, no apical -carotid delay  Respiratory:  Normal breath sounds, No respiratory distress, No wheezing, No chest tenderness. ,no use of accessory muscles, diaphragm movement is normal  Cardiovascular: (PMI) apex non displaced,no lifts no thrills, no s3,no s4, Normal heart rate, Normal rhythm, No murmurs, No rubs, No gallops. Carotid arteries pulse and amplitude are normal no bruit, no abdominal bruit noted ( normal abdominal aorta ausculation), femoral arteries pulse and amplitude are normal no bruit, pedal pulses are normal  Femoral pulses have normal amplitude, no bruits   Extremities - No cyanosis, clubbing, or significant edema, no varicose veins    Abdomen - No masses, tenderness, or organomegaly, no hepato-splenomegally, no bruits  Musculoskeletal - No significant edema, no kyphosis or scoliosis, no deformity in any extremity noted, muscle strength and tone are normal  Skin: no ulcer,no scar,no stasis dermatitis   Neurologic - alert oriented times 3,Cranial nerves II through XII are grossly intact. There were no gross focal neurologic abnormalities. All sensory and motor nerves examined and were normal  Psychiatric: normal mood and affect    Lab Results   Component Value Date    TROPONINI <0.006 02/12/2014     BNP:  No results found for: BNP  PT/INR:  No results found for: [employer]  Lab Results   Component Value Date    LABA1C 7.2 (H) 07/17/2019    LABA1C 6.8 (H) 02/09/2019     Lab Results   Component Value Date    CHOL 84 07/17/2019    TRIG 145 07/17/2019    HDL 33 (L) 07/17/2019    LDLCALC 43 02/09/2019    LDLDIRECT 34 07/17/2019     Lab Results   Component Value Date    ALT 69 (H) 07/17/2019    AST 46 (H) 07/17/2019     TSH:  No results found for: TSH    Impression:  Drake Prince is a 68 y. o.year old who  has a past medical history of Diabetes mellitus ([address] History of Doppler ultrasound, History of exercise stress test, Hx of

## 2020-04-20 ENCOUNTER — VIRTUAL VISIT (OUTPATIENT)
Dept: CARDIOLOGY CLINIC | Age: 75
End: 2020-04-20
Payer: MEDICARE

## 2020-04-20 VITALS
DIASTOLIC BLOOD PRESSURE: 64 MMHG | HEART RATE: 84 BPM | WEIGHT: 203 LBS | BODY MASS INDEX: 30.87 KG/M2 | SYSTOLIC BLOOD PRESSURE: 119 MMHG

## 2020-04-20 PROCEDURE — 99214 OFFICE O/P EST MOD 30 MIN: CPT | Performed by: INTERNAL MEDICINE

## 2020-04-20 NOTE — LETTER
Jill Lang  1945  [id number]    Have you had any Chest Pain - No    Have you had any Shortness of Breath - No    Have you had any dizziness - No    Have you had any palpitations - No    Do you have a surgery or procedure scheduled in the near future - No  If Yes- DO EKG

## 2020-04-20 NOTE — PROGRESS NOTES
Ashley Henderson MD    TELEHEALTH EVALUATION -- Audio/Visual (During [id number] public health emergency)      Chief complaints: patient is here for management of CAD, DM, HTN, DYSLPIDEMIA    Subjective: patient feels better, no chest pain, no shortness of breath, no dizziness, no palpitations    HPI Charis Eubanks is a 69 y. o.year old who  has a past medical history of Diabetes mellitus ([facility] [address] History of Doppler ultrasound, History of exercise stress test, Hx of Doppler echocardiogram, Hypertension, and 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 ([address] and presents for management of CAD, DM, HTN, DYSLPIDEMIA which are well controlled      Current Outpatient Medications   Medication Sig Dispense Refill    lisinopril (PRINIVIL;ZESTRIL) 5 MG tablet Take 1 tablet by mouth daily 90 tablet 3    canagliflozin (INVOKANA) 300 MG TABS tablet Take 300 mg by mouth every morning (before breakfast)      atorvastatin (LIPITOR) 80 MG tablet Take 1 tablet by mouth daily 30 tablet 3    glimepiride (AMARYL) 4 MG tablet Take 4 mg by mouth every morning (before breakfast).  METFORMIN HCL PO Take 1,000 mg by mouth 2 times daily. No current facility-administered medications for this visit. Allergies: Patient has no known allergies.   Past Medical History:   Diagnosis Date    Diabetes mellitus ([facility] [address]     History of Doppler ultrasound 07/26/2018    arterial duplex-no significant disease    History of exercise stress test 11/30/2017    treadmill    Hx of Doppler echocardiogram 11/30/2017    EF50-60%,normal    Hypertension     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 ([facility] [address] 11/10/2017     Past Surgical History:   Procedure Laterality Date    BACK SURGERY      X 2    COLONOSCOPY  4/3/14    divertics, rectal polyps    ENDOSCOPY, COLON, DIAGNOSTIC      ENDOSCOPY, COLON, DIAGNOSTIC  05/03/2017    dysphagia, hiatal hernia    TONSILLECTOMY       Family History   Problem Relation Age of Onset   

## 2020-05-14 ENCOUNTER — HOSPITAL ENCOUNTER (OUTPATIENT)
Age: 75
Discharge: HOME | End: 2020-05-14
Payer: MEDICARE

## 2020-05-14 LAB
ALBUMIN SERPL-MCNC: 4.5 GM/DL (ref 3.4–5)
ALP BLD-CCNC: 83 IU/L (ref 40–128)
ALT SERPL-CCNC: 65 U/L (ref 10–40)
ANION GAP SERPL CALCULATED.3IONS-SCNC: 14 MMOL/L (ref 4–16)
AST SERPL-CCNC: 35 IU/L (ref 15–37)
BILIRUB SERPL-MCNC: 0.8 MG/DL (ref 0–1)
BUN BLDV-MCNC: 16 MG/DL (ref 6–23)
CALCIUM SERPL-MCNC: 9.8 MG/DL (ref 8.3–10.6)
CHLORIDE BLD-SCNC: 100 MMOL/L (ref 99–110)
CHOLESTEROL: 77 MG/DL
CO2: 24 MMOL/L (ref 21–32)
CREAT SERPL-MCNC: 0.9 MG/DL (ref 0.9–1.3)
ESTIMATED AVERAGE GLUCOSE: 183 MG/DL
GFR AFRICAN AMERICAN: >60 ML/MIN/1.73M2
GFR NON-AFRICAN AMERICAN: >60 ML/MIN/1.73M2
GLUCOSE BLD-MCNC: 153 MG/DL (ref 70–99)
HBA1C MFR BLD: 8 % (ref 4.2–6.3)
HDLC SERPL-MCNC: 32 MG/DL
LDL CHOLESTEROL DIRECT: 33 MG/DL
POTASSIUM SERPL-SCNC: 4.7 MMOL/L (ref 3.5–5.1)
SODIUM BLD-SCNC: 138 MMOL/L (ref 135–145)
T4 FREE: 1.24 NG/DL (ref 0.9–1.8)
TOTAL PROTEIN: 7.2 GM/DL (ref 6.4–8.2)
TRIGL SERPL-MCNC: 106 MG/DL
TSH HIGH SENSITIVITY: 8.21 UIU/ML (ref 0.27–4.2)

## 2020-05-14 PROCEDURE — 80061 LIPID PANEL: CPT

## 2020-05-14 PROCEDURE — 80053 COMPREHEN METABOLIC PANEL: CPT

## 2020-05-14 PROCEDURE — 36415 COLL VENOUS BLD VENIPUNCTURE: CPT

## 2020-05-14 PROCEDURE — 84439 ASSAY OF FREE THYROXINE: CPT

## 2020-05-14 PROCEDURE — 84443 ASSAY THYROID STIM HORMONE: CPT

## 2020-05-14 PROCEDURE — 83036 HEMOGLOBIN GLYCOSYLATED A1C: CPT

## 2020-05-14 PROCEDURE — 83721 ASSAY OF BLOOD LIPOPROTEIN: CPT

## 2020-06-03 ENCOUNTER — NURSE ONLY (OUTPATIENT)
Dept: CARDIOLOGY CLINIC | Age: 75
End: 2020-06-03

## 2020-06-03 RX ORDER — SAXAGLIPTIN 5 MG/1
5 TABLET, FILM COATED ORAL DAILY
COMMUNITY
Start: 2020-05-20 | End: 2021-06-03

## 2020-06-03 RX ORDER — METOPROLOL SUCCINATE 25 MG/1
25 TABLET, EXTENDED RELEASE ORAL DAILY
Qty: 90 TABLET | Refills: 3 | Status: SHIPPED | OUTPATIENT
Start: 2020-06-03 | End: 2021-03-26

## 2020-06-03 RX ORDER — METOPROLOL SUCCINATE 25 MG/1
25 TABLET, EXTENDED RELEASE ORAL DAILY
COMMUNITY
End: 2020-06-03 | Stop reason: SDUPTHER

## 2020-06-03 RX ORDER — LEVOTHYROXINE SODIUM 50 MCG
75 TABLET ORAL DAILY
COMMUNITY
Start: 2020-05-27

## 2020-08-12 ENCOUNTER — HOSPITAL ENCOUNTER (OUTPATIENT)
Age: 75
Discharge: HOME | End: 2020-08-12
Payer: MEDICARE

## 2020-08-12 LAB
ANION GAP SERPL CALCULATED.3IONS-SCNC: 13 MMOL/L (ref 4–16)
BUN BLDV-MCNC: 17 MG/DL (ref 6–23)
CALCIUM SERPL-MCNC: 9.7 MG/DL (ref 8.3–10.6)
CHLORIDE BLD-SCNC: 102 MMOL/L (ref 99–110)
CO2: 23 MMOL/L (ref 21–32)
CREAT SERPL-MCNC: 0.9 MG/DL (ref 0.9–1.3)
ESTIMATED AVERAGE GLUCOSE: 186 MG/DL
GFR AFRICAN AMERICAN: >60 ML/MIN/1.73M2
GFR NON-AFRICAN AMERICAN: >60 ML/MIN/1.73M2
GLUCOSE BLD-MCNC: 178 MG/DL (ref 70–99)
HBA1C MFR BLD: 8.1 % (ref 4.2–6.3)
POTASSIUM SERPL-SCNC: 4.6 MMOL/L (ref 3.5–5.1)
SODIUM BLD-SCNC: 138 MMOL/L (ref 135–145)
T4 FREE: 1.46 NG/DL (ref 0.9–1.8)
TSH HIGH SENSITIVITY: 4.86 UIU/ML (ref 0.27–4.2)

## 2020-08-12 PROCEDURE — 83036 HEMOGLOBIN GLYCOSYLATED A1C: CPT

## 2020-08-12 PROCEDURE — 36415 COLL VENOUS BLD VENIPUNCTURE: CPT

## 2020-08-12 PROCEDURE — 84439 ASSAY OF FREE THYROXINE: CPT

## 2020-08-12 PROCEDURE — 80048 BASIC METABOLIC PNL TOTAL CA: CPT

## 2020-08-12 PROCEDURE — 84443 ASSAY THYROID STIM HORMONE: CPT

## 2020-09-17 ENCOUNTER — HOSPITAL ENCOUNTER (INPATIENT)
Age: 75
LOS: 3 days | Discharge: HOME | DRG: 392 | End: 2020-09-21
Attending: SURGERY | Admitting: SURGERY
Payer: MEDICARE

## 2020-09-17 ENCOUNTER — APPOINTMENT (OUTPATIENT)
Dept: CT IMAGING | Age: 75
DRG: 392 | End: 2020-09-17
Payer: MEDICARE

## 2020-09-17 LAB
ALBUMIN SERPL-MCNC: 3.6 GM/DL (ref 3.4–5)
ALP BLD-CCNC: 103 IU/L (ref 40–129)
ALT SERPL-CCNC: 20 U/L (ref 10–40)
ANION GAP SERPL CALCULATED.3IONS-SCNC: 21 MMOL/L (ref 4–16)
AST SERPL-CCNC: 11 IU/L (ref 15–37)
BANDED NEUTROPHILS ABSOLUTE COUNT: 7.38 K/CU MM
BANDED NEUTROPHILS RELATIVE PERCENT: 36 % (ref 5–11)
BILIRUB SERPL-MCNC: 0.5 MG/DL (ref 0–1)
BUN BLDV-MCNC: 38 MG/DL (ref 6–23)
CALCIUM SERPL-MCNC: 8.7 MG/DL (ref 8.3–10.6)
CHLORIDE BLD-SCNC: 96 MMOL/L (ref 99–110)
CO2: 17 MMOL/L (ref 21–32)
CREAT SERPL-MCNC: 1.9 MG/DL (ref 0.9–1.3)
DIFFERENTIAL TYPE: ABNORMAL
EOSINOPHILS ABSOLUTE: 3.3 K/CU MM
EOSINOPHILS RELATIVE PERCENT: 16 % (ref 0–3)
GFR AFRICAN AMERICAN: 42 ML/MIN/1.73M2
GFR NON-AFRICAN AMERICAN: 35 ML/MIN/1.73M2
GLUCOSE BLD-MCNC: 284 MG/DL (ref 70–99)
HCT VFR BLD CALC: 54.4 % (ref 42–52)
HEMOGLOBIN: 19.1 GM/DL (ref 13.5–18)
LACTATE: 4.8 MMOL/L (ref 0.4–2)
LYMPHOCYTES ABSOLUTE: 1 K/CU MM
LYMPHOCYTES RELATIVE PERCENT: 5 % (ref 24–44)
MAGNESIUM: 1.6 MG/DL (ref 1.8–2.4)
MCH RBC QN AUTO: 33.2 PG (ref 27–31)
MCHC RBC AUTO-ENTMCNC: 35.1 % (ref 32–36)
MCV RBC AUTO: 94.4 FL (ref 78–100)
METAMYELOCYTES ABSOLUTE COUNT: 0.21 K/CU MM
METAMYELOCYTES PERCENT: 1 %
MONOCYTES ABSOLUTE: 1.9 K/CU MM
MONOCYTES RELATIVE PERCENT: 9 % (ref 0–4)
PDW BLD-RTO: 14.1 % (ref 11.7–14.9)
PLATELET # BLD: 282 K/CU MM (ref 140–440)
PMV BLD AUTO: 9.8 FL (ref 7.5–11.1)
POTASSIUM SERPL-SCNC: 4.2 MMOL/L (ref 3.5–5.1)
PROMYELOCYTES ABSOLUTE COUNT: 0.21 K/CU MM
PROMYELOCYTES PERCENT: 1 %
RBC # BLD: 5.76 M/CU MM (ref 4.6–6.2)
SEGMENTED NEUTROPHILS ABSOLUTE COUNT: 6.6 K/CU MM
SEGMENTED NEUTROPHILS RELATIVE PERCENT: 32 % (ref 36–66)
SODIUM BLD-SCNC: 134 MMOL/L (ref 135–145)
TOTAL PROTEIN: 6.4 GM/DL (ref 6.4–8.2)
TOXIC GRANULATION: PRESENT
WBC # BLD: 20.6 K/CU MM (ref 4–10.5)

## 2020-09-17 PROCEDURE — 83735 ASSAY OF MAGNESIUM: CPT

## 2020-09-17 PROCEDURE — 96375 TX/PRO/DX INJ NEW DRUG ADDON: CPT

## 2020-09-17 PROCEDURE — 96361 HYDRATE IV INFUSION ADD-ON: CPT

## 2020-09-17 PROCEDURE — 93005 ELECTROCARDIOGRAM TRACING: CPT | Performed by: EMERGENCY MEDICINE

## 2020-09-17 PROCEDURE — 85007 BL SMEAR W/DIFF WBC COUNT: CPT

## 2020-09-17 PROCEDURE — 99285 EMERGENCY DEPT VISIT HI MDM: CPT

## 2020-09-17 PROCEDURE — 2580000003 HC RX 258: Performed by: EMERGENCY MEDICINE

## 2020-09-17 PROCEDURE — 85027 COMPLETE CBC AUTOMATED: CPT

## 2020-09-17 PROCEDURE — 83605 ASSAY OF LACTIC ACID: CPT

## 2020-09-17 PROCEDURE — 6360000002 HC RX W HCPCS: Performed by: EMERGENCY MEDICINE

## 2020-09-17 PROCEDURE — 70450 CT HEAD/BRAIN W/O DYE: CPT

## 2020-09-17 PROCEDURE — 80053 COMPREHEN METABOLIC PANEL: CPT

## 2020-09-17 PROCEDURE — 82010 KETONE BODYS QUAN: CPT

## 2020-09-17 RX ORDER — ONDANSETRON 2 MG/ML
4 INJECTION INTRAMUSCULAR; INTRAVENOUS ONCE
Status: COMPLETED | OUTPATIENT
Start: 2020-09-17 | End: 2020-09-17

## 2020-09-17 RX ORDER — 0.9 % SODIUM CHLORIDE 0.9 %
1000 INTRAVENOUS SOLUTION INTRAVENOUS ONCE
Status: COMPLETED | OUTPATIENT
Start: 2020-09-17 | End: 2020-09-18

## 2020-09-17 RX ORDER — MAGNESIUM SULFATE 1 G/100ML
1 INJECTION INTRAVENOUS ONCE
Status: COMPLETED | OUTPATIENT
Start: 2020-09-17 | End: 2020-09-18

## 2020-09-17 RX ADMIN — SODIUM CHLORIDE 1000 ML: 9 INJECTION, SOLUTION INTRAVENOUS at 22:57

## 2020-09-17 RX ADMIN — ONDANSETRON 4 MG: 2 INJECTION INTRAMUSCULAR; INTRAVENOUS at 23:00

## 2020-09-18 ENCOUNTER — APPOINTMENT (OUTPATIENT)
Dept: CT IMAGING | Age: 75
DRG: 392 | End: 2020-09-18
Payer: MEDICARE

## 2020-09-18 PROBLEM — E86.0 DEHYDRATION: Status: ACTIVE | Noted: 2020-09-18

## 2020-09-18 PROBLEM — R19.7 DIARRHEA: Status: ACTIVE | Noted: 2020-09-18

## 2020-09-18 PROBLEM — N17.9 AKI (ACUTE KIDNEY INJURY) (HCC): Status: ACTIVE | Noted: 2020-09-18

## 2020-09-18 LAB
ALBUMIN SERPL-MCNC: 3.4 GM/DL (ref 3.4–5)
ALP BLD-CCNC: 89 IU/L (ref 40–129)
ALT SERPL-CCNC: 18 U/L (ref 10–40)
ANION GAP SERPL CALCULATED.3IONS-SCNC: 17 MMOL/L (ref 4–16)
ANION GAP SERPL CALCULATED.3IONS-SCNC: 18 MMOL/L (ref 4–16)
AST SERPL-CCNC: 13 IU/L (ref 15–37)
BACTERIA: NEGATIVE /HPF
BASOPHILS ABSOLUTE: 0.1 K/CU MM
BASOPHILS RELATIVE PERCENT: 0.4 % (ref 0–1)
BETA-HYDROXYBUTYRATE: 4.4 MG/DL (ref 0–3)
BILIRUB SERPL-MCNC: 0.4 MG/DL (ref 0–1)
BILIRUBIN URINE: NEGATIVE MG/DL
BLOOD, URINE: NEGATIVE
BUN BLDV-MCNC: 34 MG/DL (ref 6–23)
BUN BLDV-MCNC: 39 MG/DL (ref 6–23)
CALCIUM SERPL-MCNC: 8.4 MG/DL (ref 8.3–10.6)
CALCIUM SERPL-MCNC: 8.5 MG/DL (ref 8.3–10.6)
CHLORIDE BLD-SCNC: 101 MMOL/L (ref 99–110)
CHLORIDE BLD-SCNC: 99 MMOL/L (ref 99–110)
CLARITY: CLEAR
CLOSTRIDIUM DIFFICILE, PCR: NORMAL
CO2: 17 MMOL/L (ref 21–32)
CO2: 20 MMOL/L (ref 21–32)
COLOR: YELLOW
CREAT SERPL-MCNC: 1.6 MG/DL (ref 0.9–1.3)
CREAT SERPL-MCNC: 2 MG/DL (ref 0.9–1.3)
DIFFERENTIAL TYPE: ABNORMAL
EOSINOPHILS ABSOLUTE: 3.3 K/CU MM
EOSINOPHILS RELATIVE PERCENT: 17.8 % (ref 0–3)
ESTIMATED AVERAGE GLUCOSE: 166 MG/DL
GFR AFRICAN AMERICAN: 40 ML/MIN/1.73M2
GFR AFRICAN AMERICAN: 52 ML/MIN/1.73M2
GFR NON-AFRICAN AMERICAN: 33 ML/MIN/1.73M2
GFR NON-AFRICAN AMERICAN: 43 ML/MIN/1.73M2
GLUCOSE BLD-MCNC: 129 MG/DL (ref 70–99)
GLUCOSE BLD-MCNC: 134 MG/DL (ref 70–99)
GLUCOSE BLD-MCNC: 170 MG/DL (ref 70–99)
GLUCOSE BLD-MCNC: 186 MG/DL (ref 70–99)
GLUCOSE BLD-MCNC: 224 MG/DL (ref 70–99)
GLUCOSE BLD-MCNC: 244 MG/DL (ref 70–99)
GLUCOSE BLD-MCNC: 253 MG/DL (ref 70–99)
GLUCOSE, URINE: >500 MG/DL
HBA1C MFR BLD: 7.4 % (ref 4.2–6.3)
HCT VFR BLD CALC: 51.7 % (ref 42–52)
HEMOGLOBIN: 18 GM/DL (ref 13.5–18)
HYALINE CASTS: 5 /LPF
IMMATURE NEUTROPHIL %: 1 % (ref 0–0.43)
KETONES, URINE: NEGATIVE MG/DL
LACTATE: 2.2 MMOL/L (ref 0.4–2)
LEUKOCYTE ESTERASE, URINE: NEGATIVE
LYMPHOCYTES ABSOLUTE: 1.5 K/CU MM
LYMPHOCYTES RELATIVE PERCENT: 7.9 % (ref 24–44)
MCH RBC QN AUTO: 33.1 PG (ref 27–31)
MCHC RBC AUTO-ENTMCNC: 34.8 % (ref 32–36)
MCV RBC AUTO: 95 FL (ref 78–100)
MONOCYTES ABSOLUTE: 1.4 K/CU MM
MONOCYTES RELATIVE PERCENT: 7.6 % (ref 0–4)
MUCUS: ABNORMAL HPF
NITRITE URINE, QUANTITATIVE: NEGATIVE
NUCLEATED RBC %: 0 %
PDW BLD-RTO: 14.5 % (ref 11.7–14.9)
PH, URINE: 5 (ref 5–8)
PLATELET # BLD: 211 K/CU MM (ref 140–440)
PMV BLD AUTO: 10.1 FL (ref 7.5–11.1)
POTASSIUM SERPL-SCNC: 4.9 MMOL/L (ref 3.5–5.1)
POTASSIUM SERPL-SCNC: 5 MMOL/L (ref 3.5–5.1)
PROTEIN UA: NEGATIVE MG/DL
RBC # BLD: 5.44 M/CU MM (ref 4.6–6.2)
RBC URINE: 1 /HPF (ref 0–3)
REASON FOR REJECTION: NORMAL
REJECTED TEST: NORMAL
SEGMENTED NEUTROPHILS ABSOLUTE COUNT: 12.1 K/CU MM
SEGMENTED NEUTROPHILS RELATIVE PERCENT: 65.3 % (ref 36–66)
SODIUM BLD-SCNC: 136 MMOL/L (ref 135–145)
SODIUM BLD-SCNC: 136 MMOL/L (ref 135–145)
SPECIFIC GRAVITY UA: 1.03 (ref 1–1.03)
TOTAL IMMATURE NEUTOROPHIL: 0.18 K/CU MM
TOTAL NUCLEATED RBC: 0 K/CU MM
TOTAL PROTEIN: 5.5 GM/DL (ref 6.4–8.2)
TRICHOMONAS: ABNORMAL /HPF
UROBILINOGEN, URINE: NORMAL MG/DL (ref 0.2–1)
WBC # BLD: 18.5 K/CU MM (ref 4–10.5)
WBC UA: 9 /HPF (ref 0–2)

## 2020-09-18 PROCEDURE — 6360000002 HC RX W HCPCS: Performed by: SURGERY

## 2020-09-18 PROCEDURE — G0378 HOSPITAL OBSERVATION PER HR: HCPCS

## 2020-09-18 PROCEDURE — 81001 URINALYSIS AUTO W/SCOPE: CPT

## 2020-09-18 PROCEDURE — 2580000003 HC RX 258: Performed by: SURGERY

## 2020-09-18 PROCEDURE — 82962 GLUCOSE BLOOD TEST: CPT

## 2020-09-18 PROCEDURE — 80053 COMPREHEN METABOLIC PANEL: CPT

## 2020-09-18 PROCEDURE — 96367 TX/PROPH/DG ADDL SEQ IV INF: CPT

## 2020-09-18 PROCEDURE — 51702 INSERT TEMP BLADDER CATH: CPT

## 2020-09-18 PROCEDURE — 96361 HYDRATE IV INFUSION ADD-ON: CPT

## 2020-09-18 PROCEDURE — 93010 ELECTROCARDIOGRAM REPORT: CPT | Performed by: INTERNAL MEDICINE

## 2020-09-18 PROCEDURE — 83036 HEMOGLOBIN GLYCOSYLATED A1C: CPT

## 2020-09-18 PROCEDURE — 87324 CLOSTRIDIUM AG IA: CPT

## 2020-09-18 PROCEDURE — 99223 1ST HOSP IP/OBS HIGH 75: CPT | Performed by: SURGERY

## 2020-09-18 PROCEDURE — 6360000004 HC RX CONTRAST MEDICATION: Performed by: EMERGENCY MEDICINE

## 2020-09-18 PROCEDURE — 6360000004 HC RX CONTRAST MEDICATION: Performed by: SURGERY

## 2020-09-18 PROCEDURE — 74174 CTA ABD&PLVS W/CONTRAST: CPT

## 2020-09-18 PROCEDURE — 36415 COLL VENOUS BLD VENIPUNCTURE: CPT

## 2020-09-18 PROCEDURE — 94761 N-INVAS EAR/PLS OXIMETRY MLT: CPT

## 2020-09-18 PROCEDURE — 6360000002 HC RX W HCPCS: Performed by: EMERGENCY MEDICINE

## 2020-09-18 PROCEDURE — 96365 THER/PROPH/DIAG IV INF INIT: CPT

## 2020-09-18 PROCEDURE — 85025 COMPLETE CBC W/AUTO DIFF WBC: CPT

## 2020-09-18 PROCEDURE — 74177 CT ABD & PELVIS W/CONTRAST: CPT

## 2020-09-18 PROCEDURE — 96366 THER/PROPH/DIAG IV INF ADDON: CPT

## 2020-09-18 PROCEDURE — 80048 BASIC METABOLIC PNL TOTAL CA: CPT

## 2020-09-18 PROCEDURE — 83605 ASSAY OF LACTIC ACID: CPT

## 2020-09-18 PROCEDURE — 1200000000 HC SEMI PRIVATE

## 2020-09-18 PROCEDURE — 2580000003 HC RX 258: Performed by: EMERGENCY MEDICINE

## 2020-09-18 RX ORDER — ONDANSETRON 2 MG/ML
4 INJECTION INTRAMUSCULAR; INTRAVENOUS EVERY 6 HOURS PRN
Status: DISCONTINUED | OUTPATIENT
Start: 2020-09-18 | End: 2020-09-21 | Stop reason: HOSPADM

## 2020-09-18 RX ORDER — SODIUM CHLORIDE 0.9 % (FLUSH) 0.9 %
10 SYRINGE (ML) INJECTION 2 TIMES DAILY
Status: DISCONTINUED | OUTPATIENT
Start: 2020-09-18 | End: 2020-09-21 | Stop reason: HOSPADM

## 2020-09-18 RX ORDER — SODIUM CHLORIDE 0.9 % (FLUSH) 0.9 %
10 SYRINGE (ML) INJECTION PRN
Status: DISCONTINUED | OUTPATIENT
Start: 2020-09-18 | End: 2020-09-21 | Stop reason: HOSPADM

## 2020-09-18 RX ORDER — SODIUM CHLORIDE 0.9 % (FLUSH) 0.9 %
10 SYRINGE (ML) INJECTION EVERY 12 HOURS SCHEDULED
Status: DISCONTINUED | OUTPATIENT
Start: 2020-09-18 | End: 2020-09-21 | Stop reason: HOSPADM

## 2020-09-18 RX ORDER — DEXTROSE MONOHYDRATE 25 G/50ML
12.5 INJECTION, SOLUTION INTRAVENOUS PRN
Status: DISCONTINUED | OUTPATIENT
Start: 2020-09-18 | End: 2020-09-21 | Stop reason: HOSPADM

## 2020-09-18 RX ORDER — LEVOTHYROXINE SODIUM 0.05 MG/1
50 TABLET ORAL DAILY
Status: DISCONTINUED | OUTPATIENT
Start: 2020-09-18 | End: 2020-09-21 | Stop reason: HOSPADM

## 2020-09-18 RX ORDER — NICOTINE POLACRILEX 4 MG
15 LOZENGE BUCCAL PRN
Status: DISCONTINUED | OUTPATIENT
Start: 2020-09-18 | End: 2020-09-21 | Stop reason: HOSPADM

## 2020-09-18 RX ORDER — ATORVASTATIN CALCIUM 80 MG/1
80 TABLET, FILM COATED ORAL DAILY
Status: DISCONTINUED | OUTPATIENT
Start: 2020-09-18 | End: 2020-09-21 | Stop reason: HOSPADM

## 2020-09-18 RX ORDER — 0.9 % SODIUM CHLORIDE 0.9 %
1000 INTRAVENOUS SOLUTION INTRAVENOUS ONCE
Status: COMPLETED | OUTPATIENT
Start: 2020-09-18 | End: 2020-09-18

## 2020-09-18 RX ORDER — SODIUM CHLORIDE, SODIUM LACTATE, POTASSIUM CHLORIDE, CALCIUM CHLORIDE 600; 310; 30; 20 MG/100ML; MG/100ML; MG/100ML; MG/100ML
INJECTION, SOLUTION INTRAVENOUS CONTINUOUS
Status: DISCONTINUED | OUTPATIENT
Start: 2020-09-18 | End: 2020-09-20

## 2020-09-18 RX ORDER — PROMETHAZINE HYDROCHLORIDE 25 MG/1
12.5 TABLET ORAL EVERY 6 HOURS PRN
Status: DISCONTINUED | OUTPATIENT
Start: 2020-09-18 | End: 2020-09-21 | Stop reason: HOSPADM

## 2020-09-18 RX ORDER — METOPROLOL SUCCINATE 25 MG/1
25 TABLET, EXTENDED RELEASE ORAL DAILY
Status: DISCONTINUED | OUTPATIENT
Start: 2020-09-18 | End: 2020-09-21 | Stop reason: HOSPADM

## 2020-09-18 RX ORDER — ACETAMINOPHEN 325 MG/1
650 TABLET ORAL EVERY 6 HOURS PRN
Status: DISCONTINUED | OUTPATIENT
Start: 2020-09-18 | End: 2020-09-21 | Stop reason: HOSPADM

## 2020-09-18 RX ORDER — ACETAMINOPHEN 650 MG/1
650 SUPPOSITORY RECTAL EVERY 6 HOURS PRN
Status: DISCONTINUED | OUTPATIENT
Start: 2020-09-18 | End: 2020-09-21 | Stop reason: HOSPADM

## 2020-09-18 RX ORDER — DEXTROSE MONOHYDRATE 50 MG/ML
100 INJECTION, SOLUTION INTRAVENOUS PRN
Status: DISCONTINUED | OUTPATIENT
Start: 2020-09-18 | End: 2020-09-21 | Stop reason: HOSPADM

## 2020-09-18 RX ADMIN — SODIUM CHLORIDE, POTASSIUM CHLORIDE, SODIUM LACTATE AND CALCIUM CHLORIDE: 600; 310; 30; 20 INJECTION, SOLUTION INTRAVENOUS at 12:25

## 2020-09-18 RX ADMIN — PIPERACILLIN AND TAZOBACTAM 3.38 G: 3; .375 INJECTION, POWDER, FOR SOLUTION INTRAVENOUS at 18:51

## 2020-09-18 RX ADMIN — PIPERACILLIN AND TAZOBACTAM 3.38 G: 3; .375 INJECTION, POWDER, FOR SOLUTION INTRAVENOUS at 10:49

## 2020-09-18 RX ADMIN — SODIUM CHLORIDE, POTASSIUM CHLORIDE, SODIUM LACTATE AND CALCIUM CHLORIDE: 600; 310; 30; 20 INJECTION, SOLUTION INTRAVENOUS at 20:15

## 2020-09-18 RX ADMIN — SODIUM CHLORIDE 1000 ML: 9 INJECTION, SOLUTION INTRAVENOUS at 00:58

## 2020-09-18 RX ADMIN — MAGNESIUM SULFATE HEPTAHYDRATE 1 G: 1 INJECTION, SOLUTION INTRAVENOUS at 00:59

## 2020-09-18 RX ADMIN — IOPAMIDOL 80 ML: 755 INJECTION, SOLUTION INTRAVENOUS at 08:24

## 2020-09-18 RX ADMIN — SODIUM CHLORIDE, POTASSIUM CHLORIDE, SODIUM LACTATE AND CALCIUM CHLORIDE: 600; 310; 30; 20 INJECTION, SOLUTION INTRAVENOUS at 04:20

## 2020-09-18 RX ADMIN — SODIUM CHLORIDE, POTASSIUM CHLORIDE, SODIUM LACTATE AND CALCIUM CHLORIDE: 600; 310; 30; 20 INJECTION, SOLUTION INTRAVENOUS at 19:03

## 2020-09-18 RX ADMIN — PIPERACILLIN AND TAZOBACTAM 3.38 G: 3; .375 INJECTION, POWDER, LYOPHILIZED, FOR SOLUTION INTRAVENOUS at 02:09

## 2020-09-18 RX ADMIN — SODIUM CHLORIDE, PRESERVATIVE FREE 10 ML: 5 INJECTION INTRAVENOUS at 00:58

## 2020-09-18 RX ADMIN — IOPAMIDOL 75 ML: 755 INJECTION, SOLUTION INTRAVENOUS at 00:46

## 2020-09-18 ASSESSMENT — PAIN DESCRIPTION - LOCATION
LOCATION: ABDOMEN
LOCATION: ABDOMEN

## 2020-09-18 ASSESSMENT — PAIN SCALES - GENERAL
PAINLEVEL_OUTOF10: 0
PAINLEVEL_OUTOF10: 2
PAINLEVEL_OUTOF10: 3

## 2020-09-18 ASSESSMENT — PAIN DESCRIPTION - DESCRIPTORS
DESCRIPTORS: DISCOMFORT
DESCRIPTORS: DISCOMFORT

## 2020-09-18 ASSESSMENT — PAIN DESCRIPTION - PAIN TYPE: TYPE: ACUTE PAIN

## 2020-09-18 ASSESSMENT — PAIN DESCRIPTION - ORIENTATION: ORIENTATION: LOWER

## 2020-09-18 NOTE — PROGRESS NOTES
9/18/2020    THankd you for the consult. Pt had a video visit yesterday for general medical problems and mentioned his diarrhea in passing, he denied abd pain or fever or bleeding. I told him to push oral fluid if possible, like pedialyte. Chart reviewed, pt examined, case reviewed with his wife who is in hte room. Lungs claerm, heart regular, abd soft with slightly hyperactive bowel sounds, although diminished in intensity. No rebound. Angiogram report reviewed. Will need to follow renal function. Full note to follow.   Marimar Paiz MD

## 2020-09-18 NOTE — ED TRIAGE NOTES
Patient complaints of diarrhea since 10pm last night. Also complaints of frequent falls due to dizziness.

## 2020-09-18 NOTE — CONSULTS
session: None    Stress: None   Relationships    Social connections     Talks on phone: None     Gets together: None     Attends [employer] service: None     Active member of club or organization: None     Attends meetings of clubs or organizations: None     Relationship status: None    Intimate partner violence     Fear of current or ex partner: None     Emotionally abused: None     Physically abused: None     Forced sexual activity: None   Other Topics Concern    None   Social History Narrative    None       Family History:   Family History   Problem Relation Age of Onset    Heart Disease Father     Heart Disease Brother        REVIEW OFSYSTEMS:    Review of Systems   Constitutional: Negative for chills. Negative for fever. HENT: Negative for congestion. Negative for rhinorrhea. Respiratory: Negative for cough. Negative for shortness of breath. Negative for wheezing. Cardiovascular: Negative for chest pain. Gastrointestinal:  Diarrhea as per HPI  Neurological: positive for dizziness, unsteadiness prior to admission. Hematological: Does not bruise/bleed easily. PHYSICAL EXAM:  Vitals:    09/17/20 2231 09/17/20 2301 09/17/20 2331 09/18/20 0130   BP: (!) 76/61 (!) 86/56 103/72 138/76   Pulse:       Resp:       Temp:       TempSrc:       SpO2:       Weight:       Height:           Physical Exam  General: awake, alert, in no acute distress  HEENT: mucous membranes moist  Respiratory: normal effort, no wheezes appreciated  CV: appears well perfused, regular rate and rhythm--mildly tachycardic  Abdomen: Soft, very mild tenderness/discomfort with deep palpation, non-distended. No guarding or rebound tenderness.   Skin: warm and dry  Extremities: atraumatic  Neuro: no focal deficits noted  Psych: mood normal        DATA:    Lab Results   Component Value Date    WBC 20.6 (H) 09/17/2020    HGB 19.1 (H) 09/17/2020    HCT 54.4 (H) 09/17/2020    MCV 94.4 09/17/2020     09/17/2020     Lab Results Component Value Date     09/17/2020    K 4.2 09/17/2020    CL 96 09/17/2020    CO2 17 09/17/2020    BUN 38 09/17/2020    CREATININE 1.9 09/17/2020    GLUCOSE 284 09/17/2020    CALCIUM 8.7 09/17/2020      1. No free intraperitoneal air or pneumatosis.  Portal venous gas is noted    with gas in the superior mesenteric vein and within the gastric vessels    within the gastric fundus.  Findings could be related to perforated peptic    ulcer or ischemic bowel. 2. 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  This can be further evaluated with CT angiogram.    3. Air-fluid levels are seen within nondilated colon, likely related to    patient's history of diarrhea.  No evidence of obstruction. IMPRESSION:    69 y.o. male with diarrhea, dehydration and abnormal CT concerning for possible mesenteric ischemia. His exam is very reassuring. We discussed getting a CTA to rule out abnormality with his celiac artery as indicated on his initial CT. He will be at risk of worsening VIOLA with a second contrast dose but I feel the risk of delaying possible diagnosis of mesenteric ischemia outweighs the risk to his kidneys. Active Problems:    Diarrhea    VIOLA (acute kidney injury) ([facility] [address]    Dehydration  Resolved Problems:    * No resolved hospital problems. *            PLAN:  - bowel rest with NPO and fluid resuscitation  - strict I/Os  - continue scheduled Zosyn  - will get CTA to evaluate the celiac artery  - serial abdominal exams, trend lactate/CBC/electrolytes  - Admit to med/surg.   will consult Pk's PCP for assistance with medical care, can be called in the AM        Electronically signed by Carlyon Denver, MD on 9/18/2020 at 2:21 AM

## 2020-09-18 NOTE — ED PROVIDER NOTES
EMERGENCY DEPARTMENT ENCOUNTER    Patient: Jean Delacruz  MRN: [id number]  : 1945  Date of Evaluation: 2020  ED Provider:  Dick Sandhu    CHIEF COMPLAINT  Chief Complaint   Patient presents with    Dizziness    Fall    Diarrhea       HPI  Jean Delacruz is a 69 y.o. male who presents with multiple nonbloody episodes of diarrhea since last night with lightheadedness and single episode of nausea and vomiting today as well as 2 falls today which occurred right after standing up suddenly. The symptoms have been constant and without any known triggering or modifying factors. He denies any abdominal pain. Denies chest pain or SOB or palpitations. Denies hitting his head and denies any loss of consciousness. Denies being on blood thinning medication. Denies any known injuries from the fall    Denies any other associated symptoms or complaints or concerns. He denies any recent antibiotic use and denies any travel out of the country or travel anywhere else. Does drink from well water but states nobody else in his home who does drink from the same water has been ill.     REVIEW OF SYSTEMS    Constitutional: negative for fever, chills  Neurological: negative for HA, focal weakness, loss of sensation  Ophthalmic: negative for vision change  ENT: negative for congestion, rhinorrhea  Cardiovascular: negative for chest pain  Respiratory: negative for SOB, cough  GI: negative for abdominal pain, constipation  : negative for dysuria, hematuria  Musculoskeletal: negative for myalgias, decreased ROM, joint swelling  Dermatological: negative for rash, wounds  Heme: Negative for bleeding, bruising      PAST MEDICAL HISTORY  Past Medical History:   Diagnosis Date    Diabetes mellitus ([facility] [address]     History of Doppler ultrasound 2018    arterial duplex-no significant disease    History of exercise stress test 2017    treadmill    Hx of Doppler echocardiogram 2017    EF50-60%,normal  Hypertension     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 ([facility]) 11/10/2017       CURRENT MEDICATIONS  [unfilled]    ALLERGIES  No Known Allergies    SURGICAL HISTORY  Past Surgical History:   Procedure Laterality Date    BACK SURGERY      X 2    COLONOSCOPY  4/3/14    divertics, rectal polyps    ENDOSCOPY, COLON, DIAGNOSTIC      ENDOSCOPY, COLON, DIAGNOSTIC  05/03/2017    dysphagia, hiatal hernia    TONSILLECTOMY         FAMILY HISTORY  Family History   Problem Relation Age of Onset    Heart Disease Father     Heart Disease Brother        SOCIAL HISTORY  Social History     Socioeconomic History    Marital status:      Spouse name: Ochoa Velasquez    Number of children: None    Years of education: None    Highest education level: None   Occupational History    None   Social Needs    Financial resource strain: None    Food insecurity     Worry: None     Inability: None    Transportation needs     Medical: None     Non-medical: None   Tobacco Use    Smoking status: Never Smoker    Smokeless tobacco: Never Used   Substance and Sexual Activity    Alcohol use: Yes     Comment: occasionally    Drug use: No    Sexual activity: Yes     Partners: Female   Lifestyle    Physical activity     Days per week: None     Minutes per session: None    Stress: None   Relationships    Social connections     Talks on phone: None     Gets together: None     Attends Zoroastrianism service: None     Active member of club or organization: None     Attends meetings of clubs or organizations: None     Relationship status: None    Intimate partner violence     Fear of current or ex partner: None     Emotionally abused: None     Physically abused: None     Forced sexual activity: None   Other Topics Concern    None   Social History Narrative    None         **Past medical, family and social histories, and nursing notes reviewed and verified by me**      PHYSICAL EXAM  VITAL SIGNS:   ED Triage Vitals [09/17/20 all of the currently available lab results from this visit (if applicable):  Results for orders placed or performed during the hospital encounter of 09/17/20   CBC Auto Differential   Result Value Ref Range    WBC 20.6 (H) 4.0 - 10.5 K/CU MM    RBC 5.76 4.6 - 6.2 M/CU MM    Hemoglobin 19.1 (H) 13.5 - 18.0 GM/DL    Hematocrit 54.4 (H) 42 - 52 %    MCV 94.4 78 - 100 FL    MCH 33.2 (H) 27 - 31 PG    MCHC 35.1 32.0 - 36.0 %    RDW 14.1 11.7 - 14.9 %    Platelets 771 112 - 470 K/CU MM    MPV 9.8 7.5 - 11.1 FL   EKG 12 Lead   Result Value Ref Range    Ventricular Rate 107 BPM    Atrial Rate 107 BPM    P-R Interval 148 ms    QRS Duration 78 ms    Q-T Interval 334 ms    QTc Calculation (Bazett) 445 ms    P Axis 55 degrees    R Axis 44 degrees    T Axis 73 degrees    Diagnosis       * Suspect unspecified pacemaker failure  Sinus tachycardia with occasional ventricular-paced complexes and premature atrial complexes  Possible Left atrial enlargement  Low voltage QRS  Cannot rule out Anterior infarct , age undetermined  Abnormal ECG  When compared with ECG of 11-NOV-2017 06:43,  Electronic ventricular pacemaker has replaced Sinus rhythm            ABNORMAL LABS:  Labs Reviewed   CBC WITH AUTO DIFFERENTIAL - Abnormal; Notable for the following components:       Result Value    WBC 20.6 (*)     Hemoglobin 19.1 (*)     Hematocrit 54.4 (*)     MCH 33.2 (*)     Promyelocytes Percent 1 (*)     Metamyelocytes Relative 1 (*)     Bands Relative 36 (*)     Segs Relative 32.0 (*)     Eosinophils % 16.0 (*)     Lymphocytes % 5.0 (*)     Monocytes % 9.0 (*)     All other components within normal limits   COMPREHENSIVE METABOLIC PANEL - Abnormal; Notable for the following components:    Sodium 134 (*)     Chloride 96 (*)     CO2 17 (*)     BUN 38 (*)     CREATININE 1.9 (*)     Glucose 284 (*)     AST 11 (*)     GFR Non- 35 (*)     GFR  42 (*)     Anion Gap 21 (*)     All other components within normal limits MAGNESIUM - Abnormal; Notable for the following components:    Magnesium 1.6 (*)     All other components within normal limits   LACTIC ACID, PLASMA - Abnormal; Notable for the following components:    Lactate 4.8 (*)     All other components within normal limits   BETA-HYDROXYBUTYRATE - Abnormal; Notable for the following components:    Beta-Hydroxybutyrate 4.4 (*)     All other components within normal limits   URINALYSIS WITH MICROSCOPIC         IMAGING STUDIES ORDERED:  CT HEAD WO CONTRAST  CT ABDOMEN PELVIS W IV CONTRAST  CTA ABDOMEN PELVIS W CONTRAST  IP CONSULT TO GENERAL SURGERY    I have personally viewed the imaging studies. The radiologist interpretation is:   CT ABDOMEN PELVIS W IV CONTRAST Additional Contrast? None   Preliminary Result   1. No free intraperitoneal air or pneumatosis. Portal venous gas is noted   with gas in the superior mesenteric vein and within the gastric vessels   within the gastric fundus. Findings could be related to perforated peptic   ulcer or ischemic bowel. 2. 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 This can be further evaluated with CT angiogram.   3. Air-fluid levels are seen within nondilated colon, likely related to   patient's history of diarrhea. No evidence of obstruction. Findings were discussed with KRISTEL VIRK at 1:32 am on 9/18/2020. CT HEAD WO CONTRAST   Final Result   No acute intracranial abnormality.          CTA ABDOMEN PELVIS W CONTRAST    (Results Pending)         MEDICATIONS ADMINISTERED:  Medications   sodium chloride flush 0.9 % injection 10 mL (10 mLs Intravenous Given 9/18/20 0058)   piperacillin-tazobactam (ZOSYN) 3.375 g in dextrose 5 % 50 mL IVPB (mini-bag) (3.375 g Intravenous New Bag 9/18/20 0209)   0.9 % sodium chloride bolus (0 mLs Intravenous Stopped 9/18/20 0057)   ondansetron (ZOFRAN) injection 4 mg (4 mg Intravenous Given 9/17/20 2300)   magnesium sulfate 1 g in dextrose 5% 100 mL IVPB (0 g Intravenous Stopped 9/18/20 0208)   0.9 % sodium chloride bolus (0 mLs Intravenous Stopped 9/18/20 0208)   iopamidol (ISOVUE-370) 76 % injection 75 mL (75 mLs Intravenous Given 9/18/20 0046)         COURSE & MEDICAL DECISION MAKING  Last vitals: /76   Pulse 106   Temp 97.6 °F (36.4 °C) (Oral)   Resp 16   Ht 5' 8\" (1.727 m)   Wt 200 lb (90.7 kg)   SpO2 98%   BMI 30.41 kg/m²     28-year-old male who presents with nausea and vomiting and diarrhea who did arrive hypotensive and tachycardic. His vital signs have significantly improved after IV fluid resuscitation. He denies any abdominal pain and his abdomen has been benign on examination upon multiple reassessments. Given that I did not initially order any imaging of the abdomen pelvis however as laboratory work-up came back profoundly abnormal I was prompted to obtain a CT scan. Patient is noted to have leukocytosis with significant bandemia. Also did have acute kidney injury as well as lactic acidosis and ketoacidosis. Also has elevation in his eosinophil count. CT scan of the abdomen pelvis was obtained and the radiologist did call me personally to notify me of the above findings raising concern for possible mesenteric ischemia. It is interesting as the patient does not have any pain and is continued to deny any significant abdominal pain. I have started the patient on Zosyn. Differential diagnoses considered included, but were not limited to, acute appendicitis, acute cholecystitis/cholangitis, acute hepatitis, acute pancreatitis, acute intra-abdominal catastrophe, acute vascular emergency, bowel obstruction, perforated bowel, incarcerated or strangulated hernia, colitis, diverticulitis, ischemic bowel, cystitis/UTI/pyelonephritis, kidney stone, and acute testicular torsion/pathology.     I discussed case with Dr. Ava Mi of general surgery who did come and evaluate patient at bedside. He is ordering a CT angiogram for further evaluation. Patient continues to deny any abdominal pain and abdomen remains benign on examination. Dr. Ochoa Jenkins will be admitting. My critical care involvement with this patient (excluding any separately billable procedures) took 47 minutes and included the following elements:    [x] Response to abnormal vitals  [x] Review and response to abnormal lab tests  [x] Review of old records/labs  [x] Discussion with family/EMS/other pre-hospital personnel  [x] Discussion with consultants/hospitalist  [x] Rechecking patient/patient response to interventions  [x] Rapid intervention to prevent deterioration  [x] Discussing with patient/family treatment options  [x] Overseeing final disposition        Clinical Impression:  1. Non-intractable vomiting with nausea, unspecified vomiting type    2. Diarrhea, unspecified type    3. Bandemia    4. Eosinophilia    5. High anion gap metabolic acidosis    6. VIOLA (acute kidney injury) ([facility] [address]    7. Abnormal abdominal CT scan        Disposition referral (if applicable):  No follow-up provider specified. Disposition medications (if applicable):  New Prescriptions    No medications on file       ED Provider Disposition Time  DISPOSITION            Electronically signed by: Perri Higgins M.D., 9/18/2020 2:10 AM      This dictation was created with voice recognition software. While attempts have been made to review the dictation as it is transcribed, on occasion the spoken word can be misinterpreted by the technology leading to omissions or inappropriate words, phrases or sentences.         Gurwinder Bowden MD  09/18/20 0148       Gurwinder Bowden MD  09/18/20 1949

## 2020-09-18 NOTE — CARE COORDINATION
Chart reviewed, in to see pt for dc planning. Introduced self and board updated. Pt was admitted for diarrhea. States she lives at home with his wife/Stacey and is independent with all needs. Explained he follows with PCP, has insurance with affordable RX co-pays and reliable transportation per himself or his wife. Discussed [facility] and pt declined [address] need nor any other discharge needs. CM remains available if needs arise.

## 2020-09-18 NOTE — PLAN OF CARE
Problem: Falls - Risk of:  Goal: Will remain free from falls  Description: Will remain free from falls  Outcome: Ongoing  Goal: Absence of physical injury  Description: Absence of physical injury  Outcome: Ongoing     Problem: Pain:  Goal: Pain level will decrease  Description: Pain level will decrease  Outcome: Ongoing  Goal: Control of acute pain  Description: Control of acute pain  Outcome: Ongoing  Goal: Control of chronic pain  Description: Control of chronic pain  Outcome: Ongoing     Problem: SAFETY  Goal: Free from accidental physical injury  Outcome: Ongoing  Goal: Free from intentional harm  Outcome: Ongoing     Problem: DAILY CARE  Goal: Daily care needs are met  Outcome: Ongoing     Problem: PAIN  Goal: Patient's pain/discomfort is manageable  Outcome: Ongoing     Problem: SKIN INTEGRITY  Goal: Skin integrity is maintained or improved  Outcome: Ongoing     Problem: KNOWLEDGE DEFICIT  Goal: Patient/S.O. demonstrates understanding of disease process, treatment plan, medications, and discharge instructions.   Outcome: Ongoing     Problem: DISCHARGE BARRIERS  Goal: Patient's continuum of care needs are met  Outcome: Ongoing

## 2020-09-18 NOTE — ED NOTES
Patient admitted to room 3016. Report called to Renae Smith RN.       Karrie Mchugh RN  09/18/20 3216

## 2020-09-18 NOTE — ED NOTES
Bed: ED-27  Expected date:   Expected time:   Means of arrival:   Comments:  Maria De Jesus [id number]  09/17/20 1096

## 2020-09-18 NOTE — ED NOTES
Pt to bathroom independently. Steady gait. Aware of need for urine sample.       Ozzy Fair RN  09/18/20 [id number]

## 2020-09-18 NOTE — ED NOTES
Dr. Mariola Enrique surgery at bedside for eval with Dr. Ovidio Mariee.       Rom Cabral RN  09/18/20 3325

## 2020-09-18 NOTE — H&P
Department of General Surgery   Surgical Service Dr. Quan Frazier   H&P Note     Date of Consult: 9/18/20     Reason for Consult:  Diarrhea, abnormal CT findings  Requesting Physician:  Dr. Cici Tate:  Diarrhea, dizziness     History Obtained From:  patient     HISTORY OF PRESENT ILLNESS:    The patient is a 69 y.o. male who presented with profuse diarrhea for 24 hours. He has some accompanying abdominal discomfort but denies real pain. No nausea, vomiting, fever or chills. He presented to the ED because he became dizzy, unstable on his feet and nearly fell. He reports the diarrhea as loose, non-bloody. He reports large volume diarrhea, worse than the bowel prep for his colonoscopy. He denies other complaints. His symptoms have greatly improved since receiving fluids in the ED.     Workup is significant for WBC of 20.6, lactic acid of 4.8, creatinine of 1.9.   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              Past Medical History:    Past Medical History        Past Medical History:   Diagnosis Date    Diabetes mellitus ([address]      History of Doppler ultrasound 07/26/2018     arterial duplex-no significant disease    History of exercise stress test 11/30/2017     treadmill    Hx of Doppler echocardiogram 11/30/2017     EF50-60%,normal    Hypertension      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 ([address] 11/10/2017           Past Surgical History:    Past Surgical History         Past Surgical History:   Procedure Laterality Date    BACK SURGERY         X 2    COLONOSCOPY   4/3/14     divertics, rectal polyps    ENDOSCOPY, COLON, DIAGNOSTIC        ENDOSCOPY, COLON, DIAGNOSTIC   05/03/2017     dysphagia, hiatal hernia    TONSILLECTOMY               Current Medications:   Current Facility-Administered Medications             Current Facility-Administered Medications Medication Dose Route Frequency Provider Last Rate Last Dose    sodium chloride flush 0.9 % injection 10 mL  10 mL Intravenous BID Colby Jeff MD   10 mL at 09/18/20 0058    piperacillin-tazobactam (ZOSYN) 3.375 g in dextrose 5 % 50 mL IVPB (mini-bag)  3.375 g Intravenous Once Lauren Banks  mL/hr at 09/18/20 0209 3.375 g at 09/18/20 0209    atorvastatin (LIPITOR) tablet 80 mg  80 mg Oral Daily Shireen Yu MD        metoprolol succinate (TOPROL XL) extended release tablet 25 mg  25 mg Oral Daily Shireen Yu MD        levothyroxine (SYNTHROID) tablet 50 mcg  50 mcg Oral Daily Shireen Yu MD                 Current Outpatient Medications   Medication Sig Dispense Refill    SYNTHROID 50 MCG tablet Take 50 mcg by mouth Daily         ONGLYZA 5 MG TABS tablet 5 mg daily         metoprolol succinate (TOPROL XL) 25 MG extended release tablet Take 1 tablet by mouth daily 90 tablet 3    lisinopril (PRINIVIL;ZESTRIL) 5 MG tablet Take 1 tablet by mouth daily 90 tablet 3    canagliflozin (INVOKANA) 300 MG TABS tablet Take 300 mg by mouth every morning (before breakfast)        atorvastatin (LIPITOR) 80 MG tablet Take 1 tablet by mouth daily 30 tablet 3    glimepiride (AMARYL) 4 MG tablet Take 4 mg by mouth every morning (before breakfast).         METFORMIN HCL PO Take 1,000 mg by mouth 2 times daily.               Allergies:  Patient has no known allergies.     Social History:   Social History   Social History            Socioeconomic History    Marital status:        Spouse name: Meeta Odom Number of children: None    Years of education: None    Highest education level: None   Occupational History    None   Social Needs    Financial resource strain: None    Food insecurity       Worry: None       Inability: None    Transportation needs       Medical: None       Non-medical: None   Tobacco Use    Smoking status: Never Smoker    Smokeless tobacco: Never Used Substance and Sexual Activity    Alcohol use: Yes       Comment: occasionally    Drug use: No    Sexual activity: Yes       Partners: Female   Lifestyle    Physical activity       Days per week: None       Minutes per session: None    Stress: None   Relationships    Social connections       Talks on phone: None       Gets together: None       Attends Hinduism service: None       Active member of club or organization: None       Attends meetings of clubs or organizations: None       Relationship status: None    Intimate partner violence       Fear of current or ex partner: None       Emotionally abused: None       Physically abused: None       Forced sexual activity: None   Other Topics Concern    None   Social History Narrative    None           Family History:   Family History         Family History   Problem Relation Age of Onset    Heart Disease Father      Heart Disease Brother             REVIEW OFSYSTEMS:    Review of Systems   Constitutional: Negative for chills. Negative for fever. HENT: Negative for congestion. Negative for rhinorrhea. Respiratory: Negative for cough. Negative for shortness of breath. Negative for wheezing. Cardiovascular: Negative for chest pain. Gastrointestinal:  Diarrhea as per HPI  Neurological: positive for dizziness, unsteadiness prior to admission.    Hematological: Does not bruise/bleed easily.         PHYSICAL EXAM:  Vitals          Vitals:     09/17/20 2231 09/17/20 2301 09/17/20 2331 09/18/20 0130   BP: (!) 76/61 (!) 86/56 103/72 138/76   Pulse:           Resp:           Temp:           TempSrc:           SpO2:           Weight:           Height:                   Physical Exam  General: awake, alert, in no acute distress  HEENT: mucous membranes moist  Respiratory: normal effort, no wheezes appreciated  CV: appears well perfused, regular rate and rhythm--mildly tachycardic  Abdomen: Soft, very mild tenderness/discomfort with deep palpation, non-distended. No guarding or rebound tenderness. Skin: warm and dry  Extremities: atraumatic  Neuro: no focal deficits noted  Psych: mood normal           DATA:          Lab Results   Component Value Date     WBC 20.6 (H) 09/17/2020     HGB 19.1 (H) 09/17/2020     HCT 54.4 (H) 09/17/2020     MCV 94.4 09/17/2020      09/17/2020           Lab Results   Component Value Date      09/17/2020     K 4.2 09/17/2020     CL 96 09/17/2020     CO2 17 09/17/2020     BUN 38 09/17/2020     CREATININE 1.9 09/17/2020     GLUCOSE 284 09/17/2020     CALCIUM 8.7 09/17/2020      1. No free intraperitoneal air or pneumatosis.  Portal venous gas is noted    with gas in the superior mesenteric vein and within the gastric vessels    within the gastric fundus.  Findings could be related to perforated peptic    ulcer or ischemic bowel. 2. 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  This can be further evaluated with CT angiogram.    3. Air-fluid levels are seen within nondilated colon, likely related to    patient's history of diarrhea.  No evidence of obstruction.             IMPRESSION:    69 y.o. male with diarrhea, dehydration and abnormal CT concerning for possible mesenteric ischemia. His exam is very reassuring. We discussed getting a CTA to rule out abnormality with his celiac artery as indicated on his initial CT. He will be at risk of worsening VIOLA with a second contrast dose but I feel the risk of delaying possible diagnosis of mesenteric ischemia outweighs the risk to his kidneys.     Active Problems:    Diarrhea    VIOLA (acute kidney injury) ([facility] [address]    Dehydration  Resolved Problems:    * No resolved hospital problems.  *                PLAN:  - bowel rest with NPO and fluid resuscitation  - strict I/Os  - continue scheduled Zosyn  - will get CTA to evaluate the celiac artery  - serial abdominal exams, trend lactate/CBC/electrolytes  - Admit to med/surg.   will consult Pk's PCP for assistance with medical care, can be called in the AM           Electronically signed by Janet Urbina MD on 9/18/2020 at 2:21 AM

## 2020-09-19 LAB
ADENOVIRUS F 40 41 PCR: NOT DETECTED
ALBUMIN SERPL-MCNC: 3.3 GM/DL (ref 3.4–5)
ALP BLD-CCNC: 89 IU/L (ref 40–128)
ALT SERPL-CCNC: 18 U/L (ref 10–40)
ANION GAP SERPL CALCULATED.3IONS-SCNC: 16 MMOL/L (ref 4–16)
AST SERPL-CCNC: 18 IU/L (ref 15–37)
ASTROVIRUS PCR: NOT DETECTED
BASOPHILS ABSOLUTE: 0.1 K/CU MM
BASOPHILS RELATIVE PERCENT: 0.5 % (ref 0–1)
BILIRUB SERPL-MCNC: 0.4 MG/DL (ref 0–1)
BUN BLDV-MCNC: 22 MG/DL (ref 6–23)
CALCIUM SERPL-MCNC: 8.4 MG/DL (ref 8.3–10.6)
CAMPYLOBACTER PCR: NOT DETECTED
CHLORIDE BLD-SCNC: 102 MMOL/L (ref 99–110)
CO2: 17 MMOL/L (ref 21–32)
CREAT SERPL-MCNC: 1 MG/DL (ref 0.9–1.3)
CRYPTOSPORIDIUM PCR: NOT DETECTED
CYCLOSPORA CAYETANENSIS PCR: NOT DETECTED
DIFFERENTIAL TYPE: ABNORMAL
E COLI 0157 PCR: NOT DETECTED
E COLI ENTEROAGGREGATIVE PCR: NOT DETECTED
E COLI ENTEROPATHOGENIC PCR: NOT DETECTED
E COLI ENTEROTOXIGENIC PCR: NOT DETECTED
E COLI SHIGA LIKE TOXIN PCR: NOT DETECTED
E COLI SHIGELLA/ENTEROINVASIVE PCR: NOT DETECTED
ENTAMOEBA HISTOLYTICA PCR: NOT DETECTED
EOSINOPHILS ABSOLUTE: 2.7 K/CU MM
EOSINOPHILS RELATIVE PERCENT: 18.3 % (ref 0–3)
GFR AFRICAN AMERICAN: >60 ML/MIN/1.73M2
GFR NON-AFRICAN AMERICAN: >60 ML/MIN/1.73M2
GIARDIA LAMBLIA PCR: NOT DETECTED
GLUCOSE BLD-MCNC: 134 MG/DL (ref 70–99)
GLUCOSE BLD-MCNC: 139 MG/DL (ref 70–99)
GLUCOSE BLD-MCNC: 144 MG/DL (ref 70–99)
GLUCOSE BLD-MCNC: 147 MG/DL (ref 70–99)
GLUCOSE BLD-MCNC: 156 MG/DL (ref 70–99)
GLUCOSE BLD-MCNC: 193 MG/DL (ref 70–99)
HCT VFR BLD CALC: 49.7 % (ref 42–52)
HEMOGLOBIN: 16.8 GM/DL (ref 13.5–18)
IMMATURE NEUTROPHIL %: 0.7 % (ref 0–0.43)
LACTATE: 2.9 MMOL/L (ref 0.4–2)
LYMPHOCYTES ABSOLUTE: 1.2 K/CU MM
LYMPHOCYTES RELATIVE PERCENT: 8.1 % (ref 24–44)
MCH RBC QN AUTO: 33.3 PG (ref 27–31)
MCHC RBC AUTO-ENTMCNC: 33.8 % (ref 32–36)
MCV RBC AUTO: 98.6 FL (ref 78–100)
MONOCYTES ABSOLUTE: 1.2 K/CU MM
MONOCYTES RELATIVE PERCENT: 7.7 % (ref 0–4)
NOROVIRUS GI GII PCR: NOT DETECTED
NUCLEATED RBC %: 0 %
PDW BLD-RTO: 14.6 % (ref 11.7–14.9)
PLATELET # BLD: 179 K/CU MM (ref 140–440)
PLESIOMONAS SHIGELLOIDES PCR: NOT DETECTED
PMV BLD AUTO: 9.3 FL (ref 7.5–11.1)
POTASSIUM SERPL-SCNC: 3.9 MMOL/L (ref 3.5–5.1)
RBC # BLD: 5.04 M/CU MM (ref 4.6–6.2)
REASON FOR REJECTION: NORMAL
REJECTED TEST: NORMAL
ROTAVIRUS A PCR: NOT DETECTED
SALMONELLA PCR: NOT DETECTED
SAPOVIRUS PCR: NOT DETECTED
SEGMENTED NEUTROPHILS ABSOLUTE COUNT: 9.7 K/CU MM
SEGMENTED NEUTROPHILS RELATIVE PERCENT: 64.7 % (ref 36–66)
SODIUM BLD-SCNC: 135 MMOL/L (ref 135–145)
TOTAL IMMATURE NEUTOROPHIL: 0.11 K/CU MM
TOTAL NUCLEATED RBC: 0 K/CU MM
TOTAL PROTEIN: 5.5 GM/DL (ref 6.4–8.2)
VIBRIO CHOLERAE PCR: NOT DETECTED
VIBRIO PCR: NOT DETECTED
WBC # BLD: 15 K/CU MM (ref 4–10.5)
YERSINIA ENTEROCOLITICA PCR: NOT DETECTED

## 2020-09-19 PROCEDURE — 99232 SBSQ HOSP IP/OBS MODERATE 35: CPT | Performed by: SURGERY

## 2020-09-19 PROCEDURE — 83605 ASSAY OF LACTIC ACID: CPT

## 2020-09-19 PROCEDURE — 96366 THER/PROPH/DIAG IV INF ADDON: CPT

## 2020-09-19 PROCEDURE — 2580000003 HC RX 258: Performed by: SURGERY

## 2020-09-19 PROCEDURE — 85025 COMPLETE CBC W/AUTO DIFF WBC: CPT

## 2020-09-19 PROCEDURE — 94761 N-INVAS EAR/PLS OXIMETRY MLT: CPT

## 2020-09-19 PROCEDURE — 6370000000 HC RX 637 (ALT 250 FOR IP): Performed by: SURGERY

## 2020-09-19 PROCEDURE — 6360000002 HC RX W HCPCS: Performed by: SURGERY

## 2020-09-19 PROCEDURE — 80053 COMPREHEN METABOLIC PANEL: CPT

## 2020-09-19 PROCEDURE — 36415 COLL VENOUS BLD VENIPUNCTURE: CPT

## 2020-09-19 PROCEDURE — G0378 HOSPITAL OBSERVATION PER HR: HCPCS

## 2020-09-19 PROCEDURE — 96372 THER/PROPH/DIAG INJ SC/IM: CPT

## 2020-09-19 PROCEDURE — 1200000000 HC SEMI PRIVATE

## 2020-09-19 PROCEDURE — 87507 IADNA-DNA/RNA PROBE TQ 12-25: CPT

## 2020-09-19 PROCEDURE — 6370000000 HC RX 637 (ALT 250 FOR IP): Performed by: GENERAL PRACTICE

## 2020-09-19 PROCEDURE — 82962 GLUCOSE BLOOD TEST: CPT

## 2020-09-19 RX ORDER — CHOLESTYRAMINE LIGHT 4 G/5.7G
4 POWDER, FOR SUSPENSION ORAL 2 TIMES DAILY
Status: DISCONTINUED | OUTPATIENT
Start: 2020-09-19 | End: 2020-09-21 | Stop reason: HOSPADM

## 2020-09-19 RX ADMIN — SODIUM CHLORIDE, POTASSIUM CHLORIDE, SODIUM LACTATE AND CALCIUM CHLORIDE: 600; 310; 30; 20 INJECTION, SOLUTION INTRAVENOUS at 12:28

## 2020-09-19 RX ADMIN — ENOXAPARIN SODIUM 40 MG: 100 INJECTION SUBCUTANEOUS at 08:28

## 2020-09-19 RX ADMIN — SODIUM CHLORIDE, PRESERVATIVE FREE 10 ML: 5 INJECTION INTRAVENOUS at 08:29

## 2020-09-19 RX ADMIN — PIPERACILLIN AND TAZOBACTAM 3.38 G: 3; .375 INJECTION, POWDER, FOR SOLUTION INTRAVENOUS at 08:27

## 2020-09-19 RX ADMIN — METOPROLOL SUCCINATE 25 MG: 25 TABLET, EXTENDED RELEASE ORAL at 08:27

## 2020-09-19 RX ADMIN — PIPERACILLIN AND TAZOBACTAM 3.38 G: 3; .375 INJECTION, POWDER, FOR SOLUTION INTRAVENOUS at 17:50

## 2020-09-19 RX ADMIN — INSULIN LISPRO 2 UNITS: 100 INJECTION, SOLUTION INTRAVENOUS; SUBCUTANEOUS at 12:27

## 2020-09-19 RX ADMIN — SODIUM CHLORIDE, POTASSIUM CHLORIDE, SODIUM LACTATE AND CALCIUM CHLORIDE: 600; 310; 30; 20 INJECTION, SOLUTION INTRAVENOUS at 22:39

## 2020-09-19 RX ADMIN — LEVOTHYROXINE SODIUM 50 MCG: 50 TABLET ORAL at 06:34

## 2020-09-19 RX ADMIN — SODIUM CHLORIDE, POTASSIUM CHLORIDE, SODIUM LACTATE AND CALCIUM CHLORIDE: 600; 310; 30; 20 INJECTION, SOLUTION INTRAVENOUS at 02:24

## 2020-09-19 RX ADMIN — SODIUM CHLORIDE, PRESERVATIVE FREE 10 ML: 5 INJECTION INTRAVENOUS at 08:32

## 2020-09-19 RX ADMIN — CHOLESTYRAMINE 4 G: 4 POWDER, FOR SUSPENSION ORAL at 17:50

## 2020-09-19 RX ADMIN — PIPERACILLIN AND TAZOBACTAM 3.38 G: 3; .375 INJECTION, POWDER, FOR SOLUTION INTRAVENOUS at 02:20

## 2020-09-19 RX ADMIN — CHOLESTYRAMINE 4 G: 4 POWDER, FOR SUSPENSION ORAL at 21:30

## 2020-09-19 RX ADMIN — ATORVASTATIN CALCIUM 80 MG: 80 TABLET, FILM COATED ORAL at 08:27

## 2020-09-19 ASSESSMENT — PAIN SCALES - GENERAL
PAINLEVEL_OUTOF10: 0
PAINLEVEL_OUTOF10: 1
PAINLEVEL_OUTOF10: 0

## 2020-09-19 NOTE — PROGRESS NOTES
INTERNAL MEDICINE PROGRESS NOTE        Lai Lang   1945   Primary Care Physician:  Kimi Lopez MD  Admit Date: 9/17/2020     Subjective:   Patient tolerated full liquid this am. He denied fever, no abdominal pain. Continues to have liquidy stool.        Objective:   BP (!) 130/59   Pulse 89   Temp 98.5 °F (36.9 °C) (Oral)   Resp 22   Ht 5' 8\" (1.727 m)   Wt 205 lb 6.4 oz (93.2 kg)   SpO2 93%   BMI 31.23 kg/m²    General appearance: alert, appears stated age and cooperative  Head: Normocephalic, without obvious abnormality, atraumatic  Neck: no adenopathy and supple, symmetrical, trachea midline  Lungs: clear to auscultation bilaterally  Heart: regular rate and rhythm and S1, S2 normal  Abdomen: soft, non-tender; bowel sounds normal; no masses,  no organomegaly  Extremities: no clubbing, cyanosis or edema  Neurologic: Grossly normal    Data Review  Lab Results   Component Value Date     09/19/2020    K 3.9 09/19/2020     09/19/2020    CO2 17 (L) 09/19/2020    CREATININE 1.0 09/19/2020    BUN 22 09/19/2020    CALCIUM 8.4 09/19/2020     Lab Results   Component Value Date    WBC 15.0 (H) 09/19/2020    HGB 16.8 09/19/2020    HCT 49.7 09/19/2020    MCV 98.6 09/19/2020     09/19/2020     INR/Prothrombin Time      Meds:    sodium chloride flush  10 mL Intravenous BID    sodium chloride flush  10 mL Intravenous 2 times per day    enoxaparin  40 mg Subcutaneous Daily    piperacillin-tazobactam  3.375 g Intravenous Q8H    atorvastatin  80 mg Oral Daily    metoprolol succinate  25 mg Oral Daily    levothyroxine  50 mcg Oral Daily    insulin lispro  0-12 Units Subcutaneous TID WC    insulin lispro  0-6 Units Subcutaneous Nightly     PRN Meds: sodium chloride flush, acetaminophen **OR** acetaminophen, promethazine **OR** ondansetron, glucose, dextrose, glucagon (rDNA), dextrose    Assessment/Plan:   Patient Active Hospital Problem List:  Patient Active Problem List Diagnosis    Chest pain    Type 2 diabetes mellitus (HCC)    Abnormal LFTs    Hypertension    Erythrocytosis    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 (HCC)    Diarrhea    VIOLA (acute kidney injury) (HCC)    Dehydration   Diarrhea:  Viral vs. Bacterial. Monitor for now. On iv antibiotics. Start oral questran  Leukocytosis: improved now. Hypothyroidism: continue medications        Plan:  Repeat blood work  Add questran powder  Monitor.

## 2020-09-19 NOTE — PLAN OF CARE
Problem: Falls - Risk of:  Goal: Will remain free from falls  Description: Will remain free from falls  9/19/2020 0243 by Radha Mcgraw LPN  Outcome: Ongoing  9/18/2020 1244 by Lazara Tompkins LPN  Outcome: Ongoing  Goal: Absence of physical injury  Description: Absence of physical injury  9/19/2020 0243 by Radha Mcgraw LPN  Outcome: Ongoing  9/18/2020 1244 by Lazara Tompkins LPN  Outcome: Ongoing     Problem: Pain:  Goal: Pain level will decrease  Description: Pain level will decrease  9/19/2020 0243 by Radha Mcgraw LPN  Outcome: Ongoing  9/18/2020 1244 by Lazara Tompkins LPN  Outcome: Ongoing  Goal: Control of acute pain  Description: Control of acute pain  9/19/2020 0243 by Radha Mcgraw LPN  Outcome: Ongoing  9/18/2020 1244 by Lazara Tompkins LPN  Outcome: Ongoing  Goal: Control of chronic pain  Description: Control of chronic pain  9/19/2020 0243 by Radha Mcgraw LPN  Outcome: Ongoing  9/18/2020 1244 by Lazara Tompkins LPN  Outcome: Ongoing     Problem: SAFETY  Goal: Free from accidental physical injury  9/19/2020 0243 by Radha Mcgraw LPN  Outcome: Ongoing  9/18/2020 1244 by Lazara Tompkins LPN  Outcome: Ongoing  Goal: Free from intentional harm  9/19/2020 0243 by Radha Mcgraw LPN  Outcome: Ongoing  9/18/2020 1244 by Lazara Tompkins LPN  Outcome: Ongoing     Problem: DAILY CARE  Goal: Daily care needs are met  9/19/2020 0243 by Radha Mcgraw LPN  Outcome: Ongoing  9/18/2020 1244 by Lazara Tompkins LPN  Outcome: Ongoing     Problem: PAIN  Goal: Patient's pain/discomfort is manageable  9/19/2020 0243 by Radha Mcgraw LPN  Outcome: Ongoing  9/18/2020 1244 by Lazara Tompkins LPN  Outcome: Ongoing     Problem: SKIN INTEGRITY  Goal: Skin integrity is maintained or improved  9/19/2020 0243 by Radha Mcgraw LPN  Outcome: Ongoing  9/18/2020 1244 by Lazara Tompkins LPN  Outcome: Ongoing     Problem: KNOWLEDGE DEFICIT  Goal: Patient/S.O. demonstrates understanding of disease process, treatment

## 2020-09-19 NOTE — PROGRESS NOTES
GENERAL SURGERY PROGRESS NOTE    Edgardo Lau is a 69 y.o. male with diarrhea, abdominal discomfort and lab abnormalities. Initially some concern for ischemic colitis. Subjective:  Doing well this morning. Reports feeling hungry. Still some watery BMs but seems to be slowing. Good UOP via alegre. Denies significant abdominal pain. Objective:    Vitals:   VITALS:  BP (!) 130/59   Pulse 105   Temp 98.5 °F (36.9 °C) (Oral)   Resp 20   Ht 5' 8\" (1.727 m)   Wt 205 lb 6.4 oz (93.2 kg)   SpO2 93%   BMI 31.23 kg/m²   INTAKE/OUTPUT:    Intake/Output Summary (Last 24 hours) at 9/19/2020 0919  Last data filed at 9/19/2020 3572  Gross per 24 hour   Intake --   Output 2050 ml   Net -2050 ml       I/O: 09/18 0701 - 09/19 0700  In: -   Out: 2050 [Urine:2050]    Labs/Imaging Results:   Lab Results   Component Value Date     09/19/2020    K 3.9 09/19/2020     09/19/2020    CO2 17 09/19/2020    BUN 22 09/19/2020    CREATININE 1.0 09/19/2020    GLUCOSE 147 09/19/2020    CALCIUM 8.4 09/19/2020      Lab Results   Component Value Date    WBC 15.0 (H) 09/19/2020    HGB 16.8 09/19/2020    HCT 49.7 09/19/2020    MCV 98.6 09/19/2020     09/19/2020       IV Fluids: lactated ringers Last Rate: 125 mL/hr at 09/19/20 0224    dextrose    Scheduled Meds:   sodium chloride flush, 10 mL, Intravenous, BID    sodium chloride flush, 10 mL, Intravenous, 2 times per day    enoxaparin, 40 mg, Subcutaneous, Daily    piperacillin-tazobactam, 3.375 g, Intravenous, Q8H    atorvastatin, 80 mg, Oral, Daily    metoprolol succinate, 25 mg, Oral, Daily    levothyroxine, 50 mcg, Oral, Daily    insulin lispro, 0-12 Units, Subcutaneous, TID WC    insulin lispro, 0-6 Units, Subcutaneous, Nightly    Physical Exam:  General: A&O x 3, no distress. HEENT: Anicteric sclerae, MMM. Extremities: atraumatic  Abdomen: Soft, nontender, nondistended.        Assessment and Plan:  69 y.o. male with diarrhea, lab abnormalities and imaging concerning for possible bowel ischemia. Condition improving. WBC down to 15, lactic acid down to 2.9 but not normalized.     Patient Active Problem List:     Chest pain     Type 2 diabetes mellitus (HCC)     Abnormal LFTs     Hypertension     Erythrocytosis     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 (HCC)     Diarrhea     VIOLA (acute kidney injury) ([facility] [address]     Dehydration      Advance to clear liquid diet today  Continue IV antibiotics  C.diff negative, appreciate any input from Medicine regarding diarrhea or Eosinophilia  Recheck labs tomorrow  Will d/c alegre as UOP is adequate    Darryle Lobos, MD

## 2020-09-19 NOTE — PLAN OF CARE
Problem: Falls - Risk of:  Goal: Will remain free from falls  Description: Will remain free from falls  9/19/2020 1456 by Dionisio Fajardo RN  Outcome: Ongoing  9/19/2020 0243 by Krystin Bhatia LPN  Outcome: Ongoing  Goal: Absence of physical injury  Description: Absence of physical injury  9/19/2020 1456 by Dionisio Fajardo RN  Outcome: Ongoing  9/19/2020 0243 by Krystin Bhatia LPN  Outcome: Ongoing     Problem: Pain:  Goal: Pain level will decrease  Description: Pain level will decrease  9/19/2020 1456 by Dionisio Fajardo RN  Outcome: Ongoing  9/19/2020 0243 by Krystin Bhatia LPN  Outcome: Ongoing  Goal: Control of acute pain  Description: Control of acute pain  9/19/2020 1456 by Dionisio Fajardo RN  Outcome: Ongoing  9/19/2020 0243 by Krystin Bhatia LPN  Outcome: Ongoing  Goal: Control of chronic pain  Description: Control of chronic pain  9/19/2020 1456 by Dionisio Fajardo RN  Outcome: Ongoing  9/19/2020 0243 by Krystin Bhatia LPN  Outcome: Ongoing     Problem: SAFETY  Goal: Free from accidental physical injury  9/19/2020 1456 by Dionisio Fajardo RN  Outcome: Ongoing  9/19/2020 0243 by Krystin Bhatia LPN  Outcome: Ongoing  Goal: Free from intentional harm  9/19/2020 1456 by Dionisio Fajardo RN  Outcome: Ongoing  9/19/2020 0243 by Krystin Bhatia LPN  Outcome: Ongoing     Problem: DAILY CARE  Goal: Daily care needs are met  9/19/2020 1456 by Dionisio Fajardo RN  Outcome: Ongoing  9/19/2020 0243 by Krystin Bhatia LPN  Outcome: Ongoing     Problem: PAIN  Goal: Patient's pain/discomfort is manageable  9/19/2020 1456 by Dionisio Fajardo RN  Outcome: Ongoing  9/19/2020 0243 by Krystin Bhatia LPN  Outcome: Ongoing     Problem: SKIN INTEGRITY  Goal: Skin integrity is maintained or improved  9/19/2020 1456 by Dionisio Fajardo RN  Outcome: Ongoing  9/19/2020 0243 by Krystin Bhatia LPN  Outcome: Ongoing     Problem: KNOWLEDGE DEFICIT  Goal: Patient/S.O. demonstrates understanding of disease process, treatment plan, medications, and discharge instructions.   9/19/2020 1456 by Nayeli Gan RN  Outcome: Ongoing  9/19/2020 0243 by Paulina Primrose, LPN  Outcome: Ongoing     Problem: DISCHARGE BARRIERS  Goal: Patient's continuum of care needs are met  9/19/2020 1456 by Nayeli Gan RN  Outcome: Ongoing  9/19/2020 0243 by Paulina Primrose, LPN  Outcome: Ongoing

## 2020-09-20 ENCOUNTER — APPOINTMENT (OUTPATIENT)
Dept: CT IMAGING | Age: 75
DRG: 392 | End: 2020-09-20
Payer: MEDICARE

## 2020-09-20 LAB
ALBUMIN SERPL-MCNC: 3.5 GM/DL (ref 3.4–5)
ALP BLD-CCNC: 83 IU/L (ref 40–129)
ALT SERPL-CCNC: 17 U/L (ref 10–40)
ANION GAP SERPL CALCULATED.3IONS-SCNC: 13 MMOL/L (ref 4–16)
AST SERPL-CCNC: 13 IU/L (ref 15–37)
BASOPHILS ABSOLUTE: 0 K/CU MM
BASOPHILS RELATIVE PERCENT: 0.4 % (ref 0–1)
BILIRUB SERPL-MCNC: 0.5 MG/DL (ref 0–1)
BUN BLDV-MCNC: 15 MG/DL (ref 6–23)
CALCIUM SERPL-MCNC: 8.7 MG/DL (ref 8.3–10.6)
CHLORIDE BLD-SCNC: 102 MMOL/L (ref 99–110)
CO2: 23 MMOL/L (ref 21–32)
CREAT SERPL-MCNC: 1 MG/DL (ref 0.9–1.3)
DIFFERENTIAL TYPE: ABNORMAL
EOSINOPHILS ABSOLUTE: 2.8 K/CU MM
EOSINOPHILS RELATIVE PERCENT: 24.7 % (ref 0–3)
GFR AFRICAN AMERICAN: >60 ML/MIN/1.73M2
GFR NON-AFRICAN AMERICAN: >60 ML/MIN/1.73M2
GLUCOSE BLD-MCNC: 138 MG/DL (ref 70–99)
GLUCOSE BLD-MCNC: 142 MG/DL (ref 70–99)
GLUCOSE BLD-MCNC: 158 MG/DL (ref 70–99)
GLUCOSE BLD-MCNC: 208 MG/DL (ref 70–99)
GLUCOSE BLD-MCNC: 215 MG/DL (ref 70–99)
HCT VFR BLD CALC: 46.1 % (ref 42–52)
HEMOGLOBIN: 15.7 GM/DL (ref 13.5–18)
IMMATURE NEUTROPHIL %: 0.7 % (ref 0–0.43)
LACTATE: 2.7 MMOL/L (ref 0.4–2)
LYMPHOCYTES ABSOLUTE: 1.2 K/CU MM
LYMPHOCYTES RELATIVE PERCENT: 10.5 % (ref 24–44)
MCH RBC QN AUTO: 32.7 PG (ref 27–31)
MCHC RBC AUTO-ENTMCNC: 34.1 % (ref 32–36)
MCV RBC AUTO: 96 FL (ref 78–100)
MONOCYTES ABSOLUTE: 0.8 K/CU MM
MONOCYTES RELATIVE PERCENT: 7 % (ref 0–4)
NUCLEATED RBC %: 0 %
PDW BLD-RTO: 14.3 % (ref 11.7–14.9)
PLATELET # BLD: 146 K/CU MM (ref 140–440)
PMV BLD AUTO: 9.3 FL (ref 7.5–11.1)
POTASSIUM SERPL-SCNC: 3.6 MMOL/L (ref 3.5–5.1)
PROCALCITONIN: 0.19
RBC # BLD: 4.8 M/CU MM (ref 4.6–6.2)
SEGMENTED NEUTROPHILS ABSOLUTE COUNT: 6.4 K/CU MM
SEGMENTED NEUTROPHILS RELATIVE PERCENT: 56.7 % (ref 36–66)
SODIUM BLD-SCNC: 138 MMOL/L (ref 135–145)
TOTAL IMMATURE NEUTOROPHIL: 0.08 K/CU MM
TOTAL NUCLEATED RBC: 0 K/CU MM
TOTAL PROTEIN: 5.4 GM/DL (ref 6.4–8.2)
WBC # BLD: 11.2 K/CU MM (ref 4–10.5)

## 2020-09-20 PROCEDURE — 6370000000 HC RX 637 (ALT 250 FOR IP): Performed by: GENERAL PRACTICE

## 2020-09-20 PROCEDURE — 6370000000 HC RX 637 (ALT 250 FOR IP): Performed by: SURGERY

## 2020-09-20 PROCEDURE — 94761 N-INVAS EAR/PLS OXIMETRY MLT: CPT

## 2020-09-20 PROCEDURE — 6360000002 HC RX W HCPCS: Performed by: SURGERY

## 2020-09-20 PROCEDURE — 80053 COMPREHEN METABOLIC PANEL: CPT

## 2020-09-20 PROCEDURE — 6360000004 HC RX CONTRAST MEDICATION: Performed by: INTERNAL MEDICINE

## 2020-09-20 PROCEDURE — G0378 HOSPITAL OBSERVATION PER HR: HCPCS

## 2020-09-20 PROCEDURE — 2580000003 HC RX 258: Performed by: SURGERY

## 2020-09-20 PROCEDURE — 96366 THER/PROPH/DIAG IV INF ADDON: CPT

## 2020-09-20 PROCEDURE — 99232 SBSQ HOSP IP/OBS MODERATE 35: CPT | Performed by: SURGERY

## 2020-09-20 PROCEDURE — 84145 PROCALCITONIN (PCT): CPT

## 2020-09-20 PROCEDURE — 1200000000 HC SEMI PRIVATE

## 2020-09-20 PROCEDURE — 83605 ASSAY OF LACTIC ACID: CPT

## 2020-09-20 PROCEDURE — 36415 COLL VENOUS BLD VENIPUNCTURE: CPT

## 2020-09-20 PROCEDURE — 96372 THER/PROPH/DIAG INJ SC/IM: CPT

## 2020-09-20 PROCEDURE — 74176 CT ABD & PELVIS W/O CONTRAST: CPT

## 2020-09-20 PROCEDURE — 82962 GLUCOSE BLOOD TEST: CPT

## 2020-09-20 PROCEDURE — 85025 COMPLETE CBC W/AUTO DIFF WBC: CPT

## 2020-09-20 RX ADMIN — IOHEXOL 50 ML: 240 INJECTION, SOLUTION INTRATHECAL; INTRAVASCULAR; INTRAVENOUS; ORAL at 16:00

## 2020-09-20 RX ADMIN — PIPERACILLIN AND TAZOBACTAM 3.38 G: 3; .375 INJECTION, POWDER, FOR SOLUTION INTRAVENOUS at 17:57

## 2020-09-20 RX ADMIN — CHOLESTYRAMINE 4 G: 4 POWDER, FOR SUSPENSION ORAL at 21:04

## 2020-09-20 RX ADMIN — SODIUM CHLORIDE, PRESERVATIVE FREE 10 ML: 5 INJECTION INTRAVENOUS at 21:05

## 2020-09-20 RX ADMIN — ENOXAPARIN SODIUM 40 MG: 100 INJECTION SUBCUTANEOUS at 09:35

## 2020-09-20 RX ADMIN — ATORVASTATIN CALCIUM 80 MG: 80 TABLET, FILM COATED ORAL at 09:35

## 2020-09-20 RX ADMIN — METOPROLOL SUCCINATE 25 MG: 25 TABLET, EXTENDED RELEASE ORAL at 09:35

## 2020-09-20 RX ADMIN — SODIUM CHLORIDE, PRESERVATIVE FREE 10 ML: 5 INJECTION INTRAVENOUS at 09:36

## 2020-09-20 RX ADMIN — PIPERACILLIN AND TAZOBACTAM 3.38 G: 3; .375 INJECTION, POWDER, FOR SOLUTION INTRAVENOUS at 09:35

## 2020-09-20 RX ADMIN — CHOLESTYRAMINE 4 G: 4 POWDER, FOR SUSPENSION ORAL at 09:35

## 2020-09-20 RX ADMIN — PIPERACILLIN AND TAZOBACTAM 3.38 G: 3; .375 INJECTION, POWDER, FOR SOLUTION INTRAVENOUS at 02:25

## 2020-09-20 RX ADMIN — LEVOTHYROXINE SODIUM 50 MCG: 50 TABLET ORAL at 06:43

## 2020-09-20 RX ADMIN — SODIUM CHLORIDE, PRESERVATIVE FREE 10 ML: 5 INJECTION INTRAVENOUS at 09:35

## 2020-09-20 RX ADMIN — INSULIN LISPRO 4 UNITS: 100 INJECTION, SOLUTION INTRAVENOUS; SUBCUTANEOUS at 11:57

## 2020-09-20 ASSESSMENT — PAIN SCALES - GENERAL
PAINLEVEL_OUTOF10: 0

## 2020-09-20 ASSESSMENT — PAIN SCALES - WONG BAKER
WONGBAKER_NUMERICALRESPONSE: 0

## 2020-09-20 NOTE — PROGRESS NOTES
INTERNAL MEDICINE PROGRESS NOTE        Deana Lang   1945   Primary Care Physician:  Paloma Johnson MD  Admit Date: 9/17/2020     Subjective:   Patient tolerated full liquid this am. Diarrhea is little less frequent. He takes Allison powder and that is helping along with antbiotics. Pt denied itching, no chest pain or shortness of breath. No fever, chills.    Objective:   /87   Pulse 88   Temp 98.2 °F (36.8 °C) (Axillary)   Resp 13   Ht 5' 8\" (1.727 m)   Wt 205 lb 6.4 oz (93.2 kg)   SpO2 97%   BMI 31.23 kg/m²    General appearance: alert, appears stated age and cooperative  Head: Normocephalic, without obvious abnormality, atraumatic  Neck: no adenopathy and supple, symmetrical, trachea midline  Lungs: clear to auscultation bilaterally  Heart: regular rate and rhythm and S1, S2 normal  Abdomen: soft, non-tender; bowel sounds normal; no masses,  no organomegaly  Extremities: no clubbing, cyanosis or edema  Neurologic: Grossly normal    Data Review  Lab Results   Component Value Date     09/20/2020    K 3.6 09/20/2020     09/20/2020    CO2 23 09/20/2020    CREATININE 1.0 09/20/2020    BUN 15 09/20/2020    CALCIUM 8.7 09/20/2020     Lab Results   Component Value Date    WBC 11.2 (H) 09/20/2020    HGB 15.7 09/20/2020    HCT 46.1 09/20/2020    MCV 96.0 09/20/2020     09/20/2020     INR/Prothrombin Time      Meds:    cholestyramine light  4 g Oral BID    sodium chloride flush  10 mL Intravenous BID    sodium chloride flush  10 mL Intravenous 2 times per day    enoxaparin  40 mg Subcutaneous Daily    piperacillin-tazobactam  3.375 g Intravenous Q8H    atorvastatin  80 mg Oral Daily    metoprolol succinate  25 mg Oral Daily    levothyroxine  50 mcg Oral Daily    insulin lispro  0-12 Units Subcutaneous TID WC    insulin lispro  0-6 Units Subcutaneous Nightly     PRN Meds: sodium chloride flush, acetaminophen **OR** acetaminophen, promethazine **OR** ondansetron, glucose, dextrose, glucagon (rDNA), dextrose    Assessment/Plan:   Patient Active Hospital Problem List:  Patient Active Problem List   Diagnosis    Chest pain    Type 2 diabetes mellitus ([facility] [address]    Abnormal LFTs    Hypertension    Erythrocytosis    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 (HCC)    Diarrhea    VIOLA (acute kidney injury) ([facility] [address]    Dehydration   Diarrhea:  Viral vs. Bacterial. Monitor for now. On iv antibiotics. Start oral questran  Leukocytosis: improved now.    Hypothyroidism: continue medications  Lactic acidosis: repeat blood work in am      Plan:  Ambulate  continue present treatment  Repeat blood work in am

## 2020-09-20 NOTE — PROGRESS NOTES
GENERAL SURGERY PROGRESS NOTE    Kong Maddox is a 69 y.o. male with diarrhea, abdominal discomfort and lab abnormalities. Initially some concern for ischemic colitis. Subjective:  Doing well this morning. BMs slowing down. He reports now having a loose BM every 3 hours or so. Reports mild lower abdominal pain. Denies other complaints. Objective:    Vitals:   VITALS:  /67   Pulse 93   Temp 98.3 °F (36.8 °C) (Oral)   Resp 17   Ht 5' 8\" (1.727 m)   Wt 205 lb 6.4 oz (93.2 kg)   SpO2 97%   BMI 31.23 kg/m²   INTAKE/OUTPUT:      Intake/Output Summary (Last 24 hours) at 9/20/2020 0907  Last data filed at 9/20/2020 0819  Gross per 24 hour   Intake 5947 ml   Output --   Net 5947 ml       I/O: 09/19 0701 - 09/20 0700  In: 980 [P.O.:980]  Out: -     Labs/Imaging Results:   Lab Results   Component Value Date     09/20/2020    K 3.6 09/20/2020     09/20/2020    CO2 23 09/20/2020    BUN 15 09/20/2020    CREATININE 1.0 09/20/2020    GLUCOSE 158 09/20/2020    CALCIUM 8.7 09/20/2020      Lab Results   Component Value Date    WBC 11.2 (H) 09/20/2020    HGB 15.7 09/20/2020    HCT 46.1 09/20/2020    MCV 96.0 09/20/2020     09/20/2020       IV Fluids: lactated ringers Last Rate: 100 mL/hr at 09/19/20 2239    dextrose    Scheduled Meds:   cholestyramine light, 4 g, Oral, BID    sodium chloride flush, 10 mL, Intravenous, BID    sodium chloride flush, 10 mL, Intravenous, 2 times per day    enoxaparin, 40 mg, Subcutaneous, Daily    piperacillin-tazobactam, 3.375 g, Intravenous, Q8H    atorvastatin, 80 mg, Oral, Daily    metoprolol succinate, 25 mg, Oral, Daily    levothyroxine, 50 mcg, Oral, Daily    insulin lispro, 0-12 Units, Subcutaneous, TID WC    insulin lispro, 0-6 Units, Subcutaneous, Nightly    Physical Exam:  General: A&O x 3, no distress. HEENT: Anicteric sclerae, MMM. Extremities: atraumatic  Abdomen: Soft, nontender, nondistended.        Assessment and Plan:  69 y.o. male with diarrhea, lab abnormalities and imaging concerning for possible bowel ischemia. Condition continues to improve. WBC decreasing. Tolerating full liquids.     Patient Active Problem List:     Chest pain     Type 2 diabetes mellitus (HCC)     Abnormal LFTs     Hypertension     Erythrocytosis     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 (HCC)     Diarrhea     VIOLA (acute kidney injury) ([facility] [address]     Dehydration      Regular diet  Will repeat CT to assure resolution of portal venous air and rule out development of other pathology  Continue antibiotics  Recheck labs tomorrow  Likely home tomorrow if he continues to improve    Micheal Mcgraw MD  9/20/2020  9:09 AM

## 2020-09-20 NOTE — PLAN OF CARE
Problem: Falls - Risk of:  Goal: Will remain free from falls  Description: Will remain free from falls  9/20/2020 0138 by Rafa Joseph LPN  Outcome: Ongoing  9/19/2020 1456 by Taisha Casas RN  Outcome: Ongoing     Problem: Pain:  Goal: Pain level will decrease  Description: Pain level will decrease  9/20/2020 0138 by Rafa Joseph LPN  Outcome: Ongoing  9/19/2020 1456 by Taisha Casas RN  Outcome: Ongoing     Problem: Pain:  Goal: Control of acute pain  Description: Control of acute pain  9/20/2020 0138 by Rafa Joseph LPN  Outcome: Ongoing  9/19/2020 1456 by Taisha Casas RN  Outcome: Ongoing     Problem: Pain:  Goal: Control of chronic pain  Description: Control of chronic pain  9/20/2020 0138 by Rafa Joseph LPN  Outcome: Ongoing  9/19/2020 1456 by Taisha Casas RN  Outcome: Ongoing     Problem: SAFETY  Goal: Free from accidental physical injury  9/20/2020 0138 by Rafa Joseph LPN  Outcome: Ongoing  9/19/2020 1456 by Taisha Casas RN  Outcome: Ongoing     Problem: Falls - Risk of:  Goal: Will remain free from falls  Description: Will remain free from falls  9/20/2020 0138 by Rafa Joseph LPN  Outcome: Ongoing  9/19/2020 1456 by Taisha Casas RN  Outcome: Ongoing  Goal: Absence of physical injury  Description: Absence of physical injury  9/20/2020 0138 by Rafa Joseph LPN  Outcome: Ongoing  9/19/2020 1456 by Taisha Casas RN  Outcome: Ongoing     Problem: Pain:  Goal: Pain level will decrease  Description: Pain level will decrease  9/20/2020 0138 by Rafa Joseph LPN  Outcome: Ongoing  9/19/2020 1456 by Taisha Casas RN  Outcome: Ongoing  Goal: Control of acute pain  Description: Control of acute pain  9/20/2020 0138 by Rafa Joseph LPN  Outcome: Ongoing  9/19/2020 1456 by Taisha Casas RN  Outcome: Ongoing  Goal: Control of chronic pain  Description: Control of chronic pain  9/20/2020 0138 by Rafa Joseph LPN  Outcome: Ongoing  9/19/2020 1456 by Taisha Casas

## 2020-09-20 NOTE — PLAN OF CARE
Problem: Falls - Risk of:  Goal: Will remain free from falls  Description: Will remain free from falls  9/20/2020 1052 by Hubert Deutsch RN  Outcome: Ongoing  9/20/2020 0138 by Tommy Charles LPN  Outcome: Ongoing  Goal: Absence of physical injury  Description: Absence of physical injury  9/20/2020 1052 by Hubert Deutsch RN  Outcome: Ongoing  9/20/2020 0138 by Tommy Charles LPN  Outcome: Ongoing     Problem: Pain:  Goal: Pain level will decrease  Description: Pain level will decrease  9/20/2020 1052 by Hubert Deutsch RN  Outcome: Ongoing  9/20/2020 0138 by Tommy Charles LPN  Outcome: Ongoing  Goal: Control of acute pain  Description: Control of acute pain  9/20/2020 1052 by Hubert Deutsch RN  Outcome: Ongoing  9/20/2020 0138 by Tommy Charles LPN  Outcome: Ongoing  Goal: Control of chronic pain  Description: Control of chronic pain  9/20/2020 1052 by Hubert Deutsch RN  Outcome: Ongoing  9/20/2020 0138 by Tommy Charles LPN  Outcome: Ongoing     Problem: SAFETY  Goal: Free from accidental physical injury  9/20/2020 1052 by Hubert Deutsch RN  Outcome: Ongoing  9/20/2020 0138 by Tommy Charles LPN  Outcome: Ongoing  Goal: Free from intentional harm  9/20/2020 1052 by Hubert Deutsch RN  Outcome: Ongoing  9/20/2020 0138 by Tommy Charles LPN  Outcome: Ongoing     Problem: DAILY CARE  Goal: Daily care needs are met  9/20/2020 1052 by Hubert Deutsch RN  Outcome: Ongoing  9/20/2020 0138 by Tommy Charles LPN  Outcome: Ongoing     Problem: PAIN  Goal: Patient's pain/discomfort is manageable  9/20/2020 1052 by Hubert Deutsch RN  Outcome: Ongoing  9/20/2020 0138 by Tommy Charles LPN  Outcome: Ongoing     Problem: SKIN INTEGRITY  Goal: Skin integrity is maintained or improved  9/20/2020 1052 by Hubert Deutsch RN  Outcome: Ongoing  9/20/2020 0138 by Tommy Charles LPN  Outcome: Ongoing     Problem: KNOWLEDGE DEFICIT  Goal: Patient/S.O. demonstrates understanding of disease process, treatment plan,

## 2020-09-21 VITALS
DIASTOLIC BLOOD PRESSURE: 64 MMHG | OXYGEN SATURATION: 96 % | WEIGHT: 205.4 LBS | HEIGHT: 68 IN | HEART RATE: 90 BPM | BODY MASS INDEX: 31.13 KG/M2 | SYSTOLIC BLOOD PRESSURE: 124 MMHG | RESPIRATION RATE: 18 BRPM | TEMPERATURE: 98.8 F

## 2020-09-21 LAB
BASOPHILS ABSOLUTE: 0 K/CU MM
BASOPHILS RELATIVE PERCENT: 0.3 % (ref 0–1)
DIFFERENTIAL TYPE: ABNORMAL
EOSINOPHILS ABSOLUTE: 2.4 K/CU MM
EOSINOPHILS RELATIVE PERCENT: 23.4 % (ref 0–3)
GLUCOSE BLD-MCNC: 153 MG/DL (ref 70–99)
HCT VFR BLD CALC: 44.3 % (ref 42–52)
HEMOGLOBIN: 15.6 GM/DL (ref 13.5–18)
IMMATURE NEUTROPHIL %: 1 % (ref 0–0.43)
LYMPHOCYTES ABSOLUTE: 1.3 K/CU MM
LYMPHOCYTES RELATIVE PERCENT: 12.5 % (ref 24–44)
MCH RBC QN AUTO: 33.3 PG (ref 27–31)
MCHC RBC AUTO-ENTMCNC: 35.2 % (ref 32–36)
MCV RBC AUTO: 94.5 FL (ref 78–100)
MONOCYTES ABSOLUTE: 0.8 K/CU MM
MONOCYTES RELATIVE PERCENT: 7.5 % (ref 0–4)
NUCLEATED RBC %: 0 %
PDW BLD-RTO: 13.9 % (ref 11.7–14.9)
PLATELET # BLD: 128 K/CU MM (ref 140–440)
PMV BLD AUTO: 9 FL (ref 7.5–11.1)
RBC # BLD: 4.69 M/CU MM (ref 4.6–6.2)
SEGMENTED NEUTROPHILS ABSOLUTE COUNT: 5.8 K/CU MM
SEGMENTED NEUTROPHILS RELATIVE PERCENT: 55.3 % (ref 36–66)
TOTAL IMMATURE NEUTOROPHIL: 0.1 K/CU MM
TOTAL NUCLEATED RBC: 0 K/CU MM
WBC # BLD: 10.4 K/CU MM (ref 4–10.5)

## 2020-09-21 PROCEDURE — 99232 SBSQ HOSP IP/OBS MODERATE 35: CPT | Performed by: SURGERY

## 2020-09-21 PROCEDURE — 6360000002 HC RX W HCPCS: Performed by: SURGERY

## 2020-09-21 PROCEDURE — 6370000000 HC RX 637 (ALT 250 FOR IP): Performed by: SURGERY

## 2020-09-21 PROCEDURE — 6370000000 HC RX 637 (ALT 250 FOR IP): Performed by: GENERAL PRACTICE

## 2020-09-21 PROCEDURE — 36415 COLL VENOUS BLD VENIPUNCTURE: CPT

## 2020-09-21 PROCEDURE — 96366 THER/PROPH/DIAG IV INF ADDON: CPT

## 2020-09-21 PROCEDURE — 2580000003 HC RX 258: Performed by: SURGERY

## 2020-09-21 PROCEDURE — G0378 HOSPITAL OBSERVATION PER HR: HCPCS

## 2020-09-21 PROCEDURE — 85025 COMPLETE CBC W/AUTO DIFF WBC: CPT

## 2020-09-21 PROCEDURE — 82785 ASSAY OF IGE: CPT

## 2020-09-21 PROCEDURE — 82962 GLUCOSE BLOOD TEST: CPT

## 2020-09-21 RX ADMIN — LEVOTHYROXINE SODIUM 50 MCG: 50 TABLET ORAL at 06:45

## 2020-09-21 RX ADMIN — ATORVASTATIN CALCIUM 80 MG: 80 TABLET, FILM COATED ORAL at 09:49

## 2020-09-21 RX ADMIN — PIPERACILLIN AND TAZOBACTAM 3.38 G: 3; .375 INJECTION, POWDER, FOR SOLUTION INTRAVENOUS at 02:30

## 2020-09-21 RX ADMIN — CHOLESTYRAMINE 4 G: 4 POWDER, FOR SUSPENSION ORAL at 09:49

## 2020-09-21 RX ADMIN — METOPROLOL SUCCINATE 25 MG: 25 TABLET, EXTENDED RELEASE ORAL at 09:49

## 2020-09-21 RX ADMIN — INSULIN LISPRO 2 UNITS: 100 INJECTION, SOLUTION INTRAVENOUS; SUBCUTANEOUS at 09:49

## 2020-09-21 ASSESSMENT — PAIN SCALES - GENERAL: PAINLEVEL_OUTOF10: 0

## 2020-09-21 NOTE — DISCHARGE SUMMARY
Physician Discharge Summary     Patient ID:  Rachele Adams  [id number]  69 y.o.  1945    Admit date: 9/17/2020    Discharge date and time: 9/21/2020 10:06 AM     Admission Physician: Dr. Melinda Jones    Discharge Physician: Navid Patel MD    Admission Diagnoses: Eosinophilia [D72.1]  Diarrhea [R19.7]  Abnormal abdominal CT scan [R93.5]  Bandemia [D72.825]  VIOLA (acute kidney injury) ([address] [N17.9]  High anion gap metabolic acidosis [B27.3]  Diarrhea, unspecified type [R19.7]  Non-intractable vomiting with nausea, unspecified vomiting type [R11.2]    Discharge Diagnoses: same    Admission Condition:poor    Discharged Condition: good    Indication for Admission: dehydration, concern for ischemic bowel, diarrhea, VIOLA    Hospital Course:  Patient had uneventful hospital course. He was admitted with severe dehydration, VIOLA and imaging concerning for bowel ischemia with questionable celiac artery abnormality and air in the portal venous system on CT. He was treated with bowel rest, IV antibiotics and antidiarrheals. Workup did not identify a cause for his diarrhea and it was likely viral gastroenteritis. WBC improved during admission, CT demonstrated resolution of his portal venous gas. He was tolerating a diet and was discharged home. His diarrhea was improving and he was encouraged to continue to drink plenty of fluids and to reach out if symptoms worsen.     Consults: Internal Medicine- Dr. Federico Sexton Order Results     Date and Time Order Name Status Description    9/21/2020 0343 IgE In process           Treatments: IV hydration, antibiotics: Zosyn, questran and bowel rest    Discharge Exam:  Physical Exam  General: awake, alert, in no acute distress  HEENT: mucous membranes moist  Respiratory: normal effort, no wheezes appreciated  CV: appears well perfused, regular rate and rhythm  Abdomen: Soft, non-tender  Skin: warm and dry  Extremities: atraumatic  Neuro: no focal deficits noted  Psych: mood normal     Disposition: home    Patient Instructions:      Medication List      CONTINUE taking these medications    atorvastatin 80 MG tablet  Commonly known as:  LIPITOR  Take 1 tablet by mouth daily     glimepiride 4 MG tablet  Commonly known as:  AMARYL     Invokana 300 MG Tabs tablet  Generic drug:  canagliflozin     lisinopril 5 MG tablet  Commonly known as:  PRINIVIL;ZESTRIL  Take 1 tablet by mouth daily     METFORMIN HCL PO     metoprolol succinate 25 MG extended release tablet  Commonly known as:  TOPROL XL  Take 1 tablet by mouth daily     Onglyza 5 MG Tabs tablet  Generic drug:  saxagliptin     Synthroid 50 MCG tablet  Generic drug:  levothyroxine            Activity: activity as tolerated  Diet:regular diet  Wound Care: none needed    Follow-up planned with Dr. Eliceo Marroquin. Follow up with Dr. Donavon Venegas if problems arise by calling [phone].     Signed:  Amna Guo MD

## 2020-09-21 NOTE — CARE COORDINATION
Followed up with patient for discharge planning and he confirmed plan of returning home with wife and denied any needs.

## 2020-09-22 LAB
EKG ATRIAL RATE: 107 BPM
EKG DIAGNOSIS: NORMAL
EKG P AXIS: 55 DEGREES
EKG P-R INTERVAL: 148 MS
EKG Q-T INTERVAL: 334 MS
EKG QRS DURATION: 78 MS
EKG QTC CALCULATION (BAZETT): 445 MS
EKG R AXIS: 44 DEGREES
EKG T AXIS: 73 DEGREES
EKG VENTRICULAR RATE: 107 BPM

## 2020-09-23 LAB — IMMUNOGLOBULIN E: ABNORMAL KU/L

## 2020-09-24 ENCOUNTER — HOSPITAL ENCOUNTER (OUTPATIENT)
Dept: ULTRASOUND IMAGING | Age: 75
Discharge: HOME | End: 2020-09-24
Payer: MEDICARE

## 2020-09-24 PROCEDURE — 93971 EXTREMITY STUDY: CPT

## 2020-10-18 PROBLEM — E86.0 DEHYDRATION: Status: RESOLVED | Noted: 2020-09-18 | Resolved: 2020-10-18

## 2020-10-22 NOTE — CONSULTS
[address] [address] [facility], [address] [facility]                                  CONSULTATION    PATIENT NAME: Fina Estes                 :        1945  MED REC NO:   [id number]                          ROOM:       [id number]  ACCOUNT NO:   [de-identified]                           ADMIT DATE: 2020  PROVIDER:     Nathanael Gomez MD    CONSULT DATE:  2020    CHIEF COMPLAINT:  Diarrhea and dizziness. HISTORY OF PRESENT ILLNESS:  The patient is a 69-year-old  male  who presented to the emergency room with multiple nonbloody episodes of  diarrhea since the previous evening. He reports having lightheadedness  and a single episode of nausea and vomiting as well as two falls today  after standing upright on the day of admission. He denies hitting his head. On admission laboratories, he was quite a  bit dehydrated. He was also hypotensive. He was given aggressive fluid  resuscitation by the emergency room and he has been admitted to the  surgical service. 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 Air fluid levels within the nondilated colon. His BUN  was 38 and creatinine was 1.9, glucose was 284. His white count was  20,000 with 36% bands, 32% segs, 16% eosinophils. Because of concerns of possible viral versus bacterial enteritis, he was  started on Zosyn. There was concern by Dr. Papi Marquez of Surgery that  there could be some vasculitis or ischemic bowel. The patient was  carefully followed for his diabetic status as well. PAST MEDICAL HISTORY:  Includes diabetes mellitus, previous Doppler,  echocardiogram, hypertension, and previous occlusion of the left  anterior descending artery in 2017.   He has also had back surgery x2,  previous colonoscopy in 2014 showing diverticula and rectal polyps, and  another one showing hiatal hernia, an EGD, and previous tonsillectomy. FAMILY HISTORY:  heart disease. SOCIAL HISTORY:  The patient does occasionally use alcohol, but he is a  nonsmoker. PHYSICAL EXAMINATION:  VITAL SIGNS:  On my examination, the patient had improved blood  pressure. In addition, his heart rate had come down from the 120 range  down to about 80. LUNGS:  Clear to A and P with good air movement. No wheeze, rales, or  rhonchi. CARDIOVASCULAR:  Heart has regular rate and rhythm. Pulses are 2+ in  all extremities. ABDOMEN:  Soft with slightly hyperactive bowel sounds, although  diminished in intensity and no rebound. EXTREMITIES:  He has trace ankle edema on the left, but none on the  right. No calf tenderness or thigh tenderness noted at this time. NEUROLOGIC:  Grossly intact. LABORATORY DATA:  Laboratory values as previously commented. IMPRESSION AND PLAN:  Diarrhea, viral versus bacterial.  Continue  current therapy with antibiotics for possible enteritis. In addition,  continue fluid resuscitation as well as DVT prophylaxis. I will follow  with you concerning his diabetic situation. The patient will continue on current therapy with hydration as well as  antibiotic coverage and continue DVT prophylaxis and will be carefully  followed.   Surgery will be evaluating the bowels for any signs of  ischemia, etc.        Will MD Richard    D: 10/22/2020 14:45:54       T: 10/22/2020 17:40:03     [email or web address]  Job#: [id number]     Doc#: [id number]    CC: [id number] Comprehensive

## 2020-12-18 ENCOUNTER — HOSPITAL ENCOUNTER (OUTPATIENT)
Age: 75
Discharge: HOME | End: 2020-12-18
Payer: MEDICARE

## 2020-12-18 LAB
ALBUMIN SERPL-MCNC: 4.6 GM/DL (ref 3.4–5)
ALP BLD-CCNC: 93 IU/L (ref 40–128)
ALT SERPL-CCNC: 76 U/L (ref 10–40)
ANION GAP SERPL CALCULATED.3IONS-SCNC: 13 MMOL/L (ref 4–16)
AST SERPL-CCNC: 51 IU/L (ref 15–37)
BILIRUB SERPL-MCNC: 0.7 MG/DL (ref 0–1)
BUN BLDV-MCNC: 16 MG/DL (ref 6–23)
CALCIUM SERPL-MCNC: 9.6 MG/DL (ref 8.3–10.6)
CHLORIDE BLD-SCNC: 100 MMOL/L (ref 99–110)
CHOLESTEROL: 100 MG/DL
CO2: 25 MMOL/L (ref 21–32)
CREAT SERPL-MCNC: 0.9 MG/DL (ref 0.9–1.3)
ESTIMATED AVERAGE GLUCOSE: 154 MG/DL
GFR AFRICAN AMERICAN: >60 ML/MIN/1.73M2
GFR NON-AFRICAN AMERICAN: >60 ML/MIN/1.73M2
GLUCOSE BLD-MCNC: 144 MG/DL (ref 70–99)
HBA1C MFR BLD: 7 % (ref 4.2–6.3)
HDLC SERPL-MCNC: 33 MG/DL
LDL CHOLESTEROL DIRECT: 48 MG/DL
POTASSIUM SERPL-SCNC: 4.4 MMOL/L (ref 3.5–5.1)
SODIUM BLD-SCNC: 138 MMOL/L (ref 135–145)
TOTAL PROTEIN: 7.4 GM/DL (ref 6.4–8.2)
TRIGL SERPL-MCNC: 111 MG/DL

## 2020-12-18 PROCEDURE — 83036 HEMOGLOBIN GLYCOSYLATED A1C: CPT

## 2020-12-18 PROCEDURE — 36415 COLL VENOUS BLD VENIPUNCTURE: CPT

## 2020-12-18 PROCEDURE — 80053 COMPREHEN METABOLIC PANEL: CPT

## 2020-12-18 PROCEDURE — 83721 ASSAY OF BLOOD LIPOPROTEIN: CPT

## 2020-12-18 PROCEDURE — 80061 LIPID PANEL: CPT

## 2021-03-26 RX ORDER — METOPROLOL SUCCINATE 25 MG/1
25 TABLET, EXTENDED RELEASE ORAL DAILY
Qty: 90 TABLET | Refills: 3 | Status: SHIPPED | OUTPATIENT
Start: 2021-03-26 | End: 2021-06-03 | Stop reason: SDUPTHER

## 2021-06-01 PROBLEM — E78.5 DYSLIPIDEMIA: Status: ACTIVE | Noted: 2021-06-01

## 2021-06-01 PROBLEM — I25.10 CAD (CORONARY ARTERY DISEASE): Status: ACTIVE | Noted: 2017-11-10

## 2021-06-03 ENCOUNTER — OFFICE VISIT (OUTPATIENT)
Dept: CARDIOLOGY CLINIC | Age: 76
End: 2021-06-03
Payer: MEDICARE

## 2021-06-03 VITALS
BODY MASS INDEX: 31.28 KG/M2 | WEIGHT: 206.4 LBS | SYSTOLIC BLOOD PRESSURE: 110 MMHG | HEART RATE: 68 BPM | DIASTOLIC BLOOD PRESSURE: 60 MMHG | HEIGHT: 68 IN

## 2021-06-03 DIAGNOSIS — Z86.718 H/O DEEP VENOUS THROMBOSIS: ICD-10-CM

## 2021-06-03 DIAGNOSIS — E78.5 DYSLIPIDEMIA: ICD-10-CM

## 2021-06-03 DIAGNOSIS — E11.9 TYPE 2 DIABETES MELLITUS WITHOUT COMPLICATION, WITH LONG-TERM CURRENT USE OF INSULIN (HCC): Chronic | ICD-10-CM

## 2021-06-03 DIAGNOSIS — I10 ESSENTIAL HYPERTENSION: ICD-10-CM

## 2021-06-03 DIAGNOSIS — Z79.4 TYPE 2 DIABETES MELLITUS WITHOUT COMPLICATION, WITH LONG-TERM CURRENT USE OF INSULIN (HCC): Chronic | ICD-10-CM

## 2021-06-03 DIAGNOSIS — I25.10 CORONARY ARTERY DISEASE INVOLVING NATIVE CORONARY ARTERY OF NATIVE HEART WITHOUT ANGINA PECTORIS: Primary | ICD-10-CM

## 2021-06-03 PROCEDURE — 99214 OFFICE O/P EST MOD 30 MIN: CPT | Performed by: NURSE PRACTITIONER

## 2021-06-03 RX ORDER — ASPIRIN 81 MG/1
81 TABLET, CHEWABLE ORAL DAILY
COMMUNITY
End: 2022-06-07

## 2021-06-03 RX ORDER — METOPROLOL SUCCINATE 25 MG/1
25 TABLET, EXTENDED RELEASE ORAL DAILY
Qty: 90 TABLET | Refills: 3 | Status: SHIPPED | OUTPATIENT
Start: 2021-06-03 | End: 2021-12-03 | Stop reason: SDUPTHER

## 2021-06-03 NOTE — ASSESSMENT & PLAN NOTE
-2020 S/P viral infection and dehydration. Has completed anticoagulation therapy. Denies any pain in left leg.

## 2021-06-03 NOTE — PROGRESS NOTES
[facility] [place]  [address]  Phone: [phone]    Fax [phone]                  Lance Duque MD, Eulogio Garcia MD, Nola Neves MD, Kieth Osgood, MD Wolfgang Garibaldi, MD Karmen Archer, JELANI Preston, JELANI Haji, JELANI Christensen, JELANI        Cardiology Progress Note      6/3/2021    RE: Koby Whitney  (1945)                             Primary cardiologist: Dr. Sai Jackson       Subjective:  CC:   1. Coronary artery disease involving native coronary artery of native heart without angina pectoris    2. Essential hypertension    3. Dyslipidemia    4. Type 2 diabetes mellitus without complication, with long-term current use of insulin ([facility] [address]    5. H/O deep venous thrombosis        HPI: Koby Whitney, who is a  77y.o. year old male with a past medical history as listed below. Patient presents to the office for follow up on CAD (PCI of LAD in 2017), HTN, DM2, H/O DVT, and hyperlipidemia. Patient is  an active male who walks regularly. Patient is  compliant with medications. Patient denies any chest pain, shortness of breath, dizziness, syncope, or palpitations.     Past Medical History:   Diagnosis Date    BPH (benign prostatic hyperplasia) 03/13/2021    Diabetes mellitus ([facility] [address]     History of Doppler ultrasound 07/26/2018    arterial duplex-no significant disease    History of exercise stress test 11/30/2017    treadmill    Hx of Doppler echocardiogram 11/30/2017    EF50-60%,normal    Hypertension     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 (HCC) 11/10/2017       Current Outpatient Medications   Medication Sig Dispense Refill    aspirin 81 MG chewable tablet Take 81 mg by mouth daily      Dulaglutide (TRULICITY) 8.59 MW/1.4OP SOPN Inject 0.75 mg into the skin once a week      metoprolol succinate (TOPROL XL) 25 MG extended release tablet Take 1 tablet by mouth daily 90 tablet 3    SYNTHROID 50 MCG tablet Take 75 mcg by mouth Daily       lisinopril (PRINIVIL;ZESTRIL) 5 MG tablet Take 1 tablet by mouth daily 90 tablet 3    canagliflozin (INVOKANA) 300 MG TABS tablet Take 300 mg by mouth every morning (before breakfast)      atorvastatin (LIPITOR) 80 MG tablet Take 1 tablet by mouth daily 30 tablet 3    glimepiride (AMARYL) 4 MG tablet Take 4 mg by mouth every morning (before breakfast).  METFORMIN HCL PO Take 1,000 mg by mouth 2 times daily. No current facility-administered medications for this visit. Review of Systems:  Review of Systems   Cardiovascular: Negative for chest pain, palpitations and leg swelling. Musculoskeletal: Negative. Skin: Negative. Neurological: Negative for dizziness and weakness. All other systems reviewed and are negative. Objective:      Physical Exam:  /60   Pulse 68   Ht 5' 8\" (1.727 m)   Wt 206 lb 6.4 oz (93.6 kg)   BMI 31.38 kg/m²   Wt Readings from Last 3 Encounters:   06/03/21 206 lb 6.4 oz (93.6 kg)   09/19/20 205 lb 6.4 oz (93.2 kg)   04/20/20 203 lb (92.1 kg)     Body mass index is 31.38 kg/m². Physical exam:  Physical Exam  Constitutional:       Appearance: He is well-developed. Cardiovascular:      Rate and Rhythm: Normal rate and regular rhythm. Pulses: Intact distal pulses. Dorsalis pedis pulses are 2+ on the right side and 2+ on the left side. Posterior tibial pulses are 2+ on the right side and 2+ on the left side. Heart sounds: Normal heart sounds, S1 normal and S2 normal.   Pulmonary:      Effort: Pulmonary effort is normal.      Breath sounds: Normal breath sounds. Musculoskeletal:         General: Normal range of motion. Skin:     General: Skin is warm and dry. Neurological:      Mental Status: He is alert and oriented to person, place, and time.           DATA:  Lab Results   Component Value Date    TROPONINI <0.006 02/12/2014 BNP:  No results found for: BNP  PT/INR:  No results found for: PTINR  Lab Results   Component Value Date    LABA1C 7.0 (H) 12/18/2020    LABA1C 7.4 (H) 09/18/2020     Lab Results   Component Value Date    CHOL 100 12/18/2020    TRIG 111 12/18/2020    HDL 33 (L) 12/18/2020    LDLCALC 43 02/09/2019    LDLDIRECT 48 12/18/2020     Lab Results   Component Value Date    ALT 76 (H) 12/18/2020    AST 51 (H) 12/18/2020     TSH:  No results found for: TSH    Vitals:    06/03/21 0909   BP: 110/60   Pulse: 68       Echo:11/30/17   Normal left ventricle structure and systolic function. Ejection fraction is   visually estimated at 50-60%. Grade I diastolic dysfunction. No significant valvular disease noted. No evidence of pericardial effusion. Essentially unremarkable echo. Cath:11/10/17  100% mid LAD occlusion, s/p primary PCI of LAD with BENNY         The ASCVD Risk score (Martha Chong., et al., 2013) failed to calculate for the following reasons: The valid total cholesterol range is 130 to 320 mg/dL      Assessment/ Plan:     CAD (coronary artery disease)  -PCI of LAD in 2017. Primary and secondary prevention discussed with patient:   -Low sodium cardiac diet   -exercise 30 min a day three days a week  Continue current medications lisinopril, Lipitor, Toprol, ASA        Dyslipidemia  -At or near goal Yes    -He is to continue current medications (Lipitor 80 mg) Hepatic function panel WNL. No abdominal pain. No myalgias.     -The nature of cardiac risk has been fully discussed with this patient. I have made him aware of his LDL target goal given his cardiovascular risk analysis. I have discussed the appropriate diet. The need for lifelong compliance in order to reduce risk is stressed. A regular exercise program is recommended to help achieve and maintain normal body weight, fitness and improve lipid balance. A written copy of a low fat, low cholesterol diet has been given to the patient. Hypertension  -Stable, continue lisinopril 5 mg daily and Toprol-XL 25 mg daily. Type 2 diabetes mellitus ([facility] [address]  -Managed by PCP, last hemoglobin A1c 7. Patient is on Trulicity, glimepiride, Metformin and Invokana    H/O deep venous thrombosis  -2020 S/P viral infection and dehydration. Has completed anticoagulation therapy. Denies any pain in left leg. Patient seen, interviewed and examined. Testing was reviewed. Patient is encouraged to exercise even a brisk walk for 30 minutes at least 3 to 4 times a week. Lifestyle and risk factor modificatons discussed. Various goals are discussed and questions answered. Continue current medications. Appropriate prescriptions are addressed. Questions answered and patient verbalizes understanding. Call for any problems, questions, or concerns. Pt is to follow up in 6 months for Cardiac management    Electronically signed by Valerie Bro.  JELANI Mckeon - CNP on 6/3/2021 at 9:36 AM

## 2021-06-03 NOTE — ASSESSMENT & PLAN NOTE
-PCI of LAD in 2017.     Primary and secondary prevention discussed with patient:   -Low sodium cardiac diet   -exercise 30 min a day three days a week  Continue current medications lisinopril, Lipitor, Toprol, ASA

## 2021-06-03 NOTE — LETTER
Debarah Gaster Dr. Serene Osler Degenhart  1945  [id number]    Have you had any Chest Pain that is not new? - No      Have you had any Shortness of Breath - No    Have you had any dizziness - No    Have you had any palpitations that are not new? - No    Is the patient on any of the following medications -   If Yes DO EKG - Needs done every 6 months    Do you have any edema - swelling in No      Vein \"LEG PROBLEM Questionnaire\"    When did you have your last labs drawn   Where did you have them done   What doctor ordered     If we do not have these labs you are retrieve these labs for these providers!     Do you have a surgery or procedure scheduled in the near future - No     Ask patient if they [facility] to [facility] if they are not already signed up     Check to see if we have an E-MAIL on file for the patient     Check medication list thoroughly!!! AND RECONCILE OUTSIDE MEDICATIONS  If dose has changed change the entire order not just the MG  BE SURE TO ASK PATIENT IF THEY NEED MEDICATION REFILLS     At check out add to every patient's \"wrap up\" the following dot phrase AFTERHOURSEDUCATION and ensure we explain this to our patients

## 2021-06-03 NOTE — ASSESSMENT & PLAN NOTE
-At or near goal Yes    -He is to continue current medications (Lipitor 80 mg) Hepatic function panel WNL. No abdominal pain. No myalgias.     -The nature of cardiac risk has been fully discussed with this patient. I have made him aware of his LDL target goal given his cardiovascular risk analysis. I have discussed the appropriate diet. The need for lifelong compliance in order to reduce risk is stressed. A regular exercise program is recommended to help achieve and maintain normal body weight, fitness and improve lipid balance. A written copy of a low fat, low cholesterol diet has been given to the patient.

## 2021-06-03 NOTE — ASSESSMENT & PLAN NOTE
-Managed by PCP, last hemoglobin A1c 7.   Patient is on Trulicity, glimepiride, Metformin and Invokana

## 2021-12-03 ENCOUNTER — OFFICE VISIT (OUTPATIENT)
Dept: CARDIOLOGY CLINIC | Age: 76
End: 2021-12-03
Payer: MEDICARE

## 2021-12-03 VITALS
HEIGHT: 68 IN | HEART RATE: 84 BPM | OXYGEN SATURATION: 96 % | WEIGHT: 202.8 LBS | BODY MASS INDEX: 30.74 KG/M2 | DIASTOLIC BLOOD PRESSURE: 74 MMHG | SYSTOLIC BLOOD PRESSURE: 126 MMHG

## 2021-12-03 DIAGNOSIS — I25.10 CORONARY ARTERY DISEASE INVOLVING NATIVE CORONARY ARTERY OF NATIVE HEART WITHOUT ANGINA PECTORIS: ICD-10-CM

## 2021-12-03 DIAGNOSIS — I82.402 DEEP VEIN THROMBOSIS (DVT) OF LEFT LOWER EXTREMITY, UNSPECIFIED CHRONICITY, UNSPECIFIED VEIN (HCC): Primary | ICD-10-CM

## 2021-12-03 PROCEDURE — 99214 OFFICE O/P EST MOD 30 MIN: CPT | Performed by: INTERNAL MEDICINE

## 2021-12-03 RX ORDER — METOPROLOL SUCCINATE 25 MG/1
25 TABLET, EXTENDED RELEASE ORAL DAILY
Qty: 90 TABLET | Refills: 3 | Status: SHIPPED | OUTPATIENT
Start: 2021-12-03 | End: 2022-06-20

## 2021-12-03 NOTE — PATIENT INSTRUCTIONS
Please be informed that if you contact our office outside of normal business hours the physician on call cannot help with any scheduling or rescheduling issues, procedure instruction questions or any type of medication issue. We advise you for any urgent/emergency that you go to the nearest emergency room! PLEASE CALL OUR OFFICE DURING NORMAL BUSINESS HOURS    Monday - Friday   8 am to 5 pm    [address]: [phone]    [place]:  [phone]    **It is YOUR responsibilty to bring medication bottles and/or updated medication list to [address] [place] [address]. This will allow us to better serve you and all your healthcare needs**      Please hold on to these instructions the  will call you within 1-9 business days when we receive authorization from your insurance. Treadmill Stress test    WHAT TO EXPECT:     The exercise stress test is a test used to provide information about how the heart responds to exertion. It involves walking on a treadmill at increasing levels of difficulty, while electrocardiogram, heart rate, and blood pressure are monitored. ? This test will take approximately 1 hours: Please arrive at the office 5-10 min before the scheduled testing time. ? Once you are taken back to the stress lab you will be asked to read and sign a consent form before proceeding with the test. At this time feel free to ask any question that you may have as the procedure is explained to you.   ? You will be attached to the EKG monitoring equipment, your blood pressure will be taken, and you will begin walking on the treadmill. The treadmill starts off slowly and every 3 minutes the treadmill speeds up and the elevation increases. The average person usually walks for a period of 6-8min. PREPARATION FOR TEST:    ? Eat a light meal such as juice and toast at least 2 hours prior to the procedure.    ? AVOID CAFFEINE 24 HOURS PRIOR TO THE TEST: Including coffee, Tea, Chris and other soft drinks even those labeled  caffeine free or decaffeinated. ? Please wear loose comfortable clothing and comfortable walking shoes. Please wear a short sleeved shirt. ? Please shower or bathe and do not apply powder or lotion to the skin prior to testing, as the electrodes will adhere better giving us a clearer visual EKG recording.    ? DO NOT TAKE BETA-BLOCKERS 24 HOURS PRIOR TO TESTING SUCH AS:Toprol XL (metoprolol ER)

## 2021-12-03 NOTE — PROGRESS NOTES
EF50-60%,normal    Hypertension     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 ([facility] [address] 11/10/2017     Past Surgical History:   Procedure Laterality Date    BACK SURGERY      X 2    COLONOSCOPY  4/3/14    divertics, rectal polyps    ENDOSCOPY, COLON, DIAGNOSTIC      ENDOSCOPY, COLON, DIAGNOSTIC  05/03/2017    dysphagia, hiatal hernia    TONSILLECTOMY       Family History   Problem Relation Age of Onset    Heart Disease Father     Heart Disease Brother      Social History     Tobacco Use    Smoking status: Never Smoker    Smokeless tobacco: Never Used   Substance Use Topics    Alcohol use: Yes     Comment: occasionally      [unfilled]  Review of Systems:   · Constitutional: No Fever or Weight Loss   · Eyes: No Decreased Vision  · ENT: No Headaches, Hearing Loss or Vertigo  · Cardiovascular: No chest pain, dyspnea on exertion, palpitations or loss of consciousness  · Respiratory: No cough or wheezing    · Gastrointestinal: No abdominal pain, appetite loss, blood in stools, constipation, diarrhea or heartburn  · Genitourinary: No dysuria, trouble voiding, or hematuria  · Musculoskeletal:  No gait disturbance, weakness or joint complaints  · Integumentary: No rash or pruritis  · Neurological: No TIA or stroke symptoms  · Psychiatric: No anxiety or depression  · Endocrine: No malaise, fatigue or temperature intolerance  · Hematologic/Lymphatic: No bleeding problems, blood clots or swollen lymph nodes  · Allergic/Immunologic: No nasal congestion or hives  All systems negative except as marked. Objective:  /74 (Site: Left Upper Arm, Position: Sitting, Cuff Size: Medium Adult)   Pulse 84   Ht 5' 8\" (1.727 m)   Wt 202 lb 12.8 oz (92 kg)   SpO2 96%   BMI 30.84 kg/m²   Wt Readings from Last 3 Encounters:   12/03/21 202 lb 12.8 oz (92 kg)   06/03/21 206 lb 6.4 oz (93.6 kg)   09/19/20 205 lb 6.4 oz (93.2 kg)     Body mass index is 30.84 kg/m².   GENERAL - Alert, oriented, pleasant, in no apparent distress,normal grooming  HEENT - pupils are intact, cornea intact, conjunctive normal color, ears are normal in exam,  Neck - Supple. No jugular venous distention noted. No carotid bruits, no apical -carotid delay  Respiratory:  Normal breath sounds, No respiratory distress, No wheezing, No chest tenderness. ,no use of accessory muscles, diaphragm movement is normal  Cardiovascular: (PMI) apex non displaced,no lifts no thrills, no s3,no s4, Normal heart rate, Normal rhythm, No murmurs, No rubs, No gallops. Carotid arteries pulse and amplitude are normal no bruit, no abdominal bruit noted ( normal abdominal aorta ausculation),   Extremities - No cyanosis, clubbing, or significant edema, no varicose veins    Abdomen - No masses, tenderness, or organomegaly, no hepato-splenomegally, no bruits  Musculoskeletal - No significant edema, no kyphosis or scoliosis, no deformity in any extremity noted, muscle strength and tone are normal  Skin: no ulcer,no scar,no stasis dermatitis   Neurologic - alert oriented times 3,Cranial nerves II through XII are grossly intact. There were no gross focal neurologic abnormalities. Psychiatric: normal mood and affect    Lab Results   Component Value Date    TROPONINI <0.006 02/12/2014     BNP:  No results found for: BNP  PT/INR:  No results found for: [facility]  Lab Results   Component Value Date    LABA1C 7.0 (H) 12/18/2020    LABA1C 7.4 (H) 09/18/2020     Lab Results   Component Value Date    CHOL 100 12/18/2020    TRIG 111 12/18/2020    HDL 33 (L) 12/18/2020    LDLCALC 43 02/09/2019    LDLDIRECT 48 12/18/2020     Lab Results   Component Value Date    ALT 76 (H) 12/18/2020    AST 51 (H) 12/18/2020     TSH:  No results found for: TSH    Impression:  Tatiana Lopez is a 77 y. o.year old who  has a past medical history of BPH (benign prostatic hyperplasia), Diabetes mellitus ([address] History of Doppler ultrasound, History of exercise stress test, Hx of Doppler echocardiogram, Hypertension, and 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 ([facility] [address] and presents with     Plan:  1. CAD: continue baby aspirin, off  EFFEINT, continue statins, ACEinhibitors, betablockers,H/O PCI OF LAD in 2017, treadmill stress test ordered  2. COVID 19; recovered last yr, HE WAS ADMITTED FOR dehydration and later on tested his own antibodies which was positive. 3. Left leg DVT:treated with anticoagulants, will recheck venous doppler again. 4. DM: stable continue invokana and amaryl, recommeded patient to ask PMD about Loyce Beets is 7.2  5. Dyslipidemia: continue statins. LDL was 34  6. AAA screening  7. HTN: stable, continue lopressor and lisinopril  8. Hypothyroidism: CPONTINUE SYNTHOIRD  9. Health maintenance: exerise and diet  1. All labs, medications and tests reviewed, continue all other medications of all above medical condition listed as is.     @[facility]@

## 2021-12-06 ENCOUNTER — PROCEDURE VISIT (OUTPATIENT)
Dept: CARDIOLOGY CLINIC | Age: 76
End: 2021-12-06
Payer: MEDICARE

## 2021-12-06 DIAGNOSIS — R06.02 SOB (SHORTNESS OF BREATH): ICD-10-CM

## 2021-12-06 DIAGNOSIS — I25.10 CORONARY ARTERY DISEASE INVOLVING NATIVE CORONARY ARTERY OF NATIVE HEART WITHOUT ANGINA PECTORIS: ICD-10-CM

## 2021-12-06 PROCEDURE — 93015 CV STRESS TEST SUPVJ I&R: CPT | Performed by: INTERNAL MEDICINE

## 2021-12-15 ENCOUNTER — PROCEDURE VISIT (OUTPATIENT)
Dept: CARDIOLOGY CLINIC | Age: 76
End: 2021-12-15
Payer: MEDICARE

## 2021-12-15 DIAGNOSIS — Z13.6 SCREENING FOR AAA (AORTIC ABDOMINAL ANEURYSM): ICD-10-CM

## 2021-12-15 DIAGNOSIS — I82.402 DEEP VEIN THROMBOSIS (DVT) OF LEFT LOWER EXTREMITY, UNSPECIFIED CHRONICITY, UNSPECIFIED VEIN (HCC): ICD-10-CM

## 2021-12-15 PROCEDURE — 76706 US ABDL AORTA SCREEN AAA: CPT | Performed by: INTERNAL MEDICINE

## 2021-12-15 PROCEDURE — 93971 EXTREMITY STUDY: CPT | Performed by: INTERNAL MEDICINE

## 2021-12-21 ENCOUNTER — TELEPHONE (OUTPATIENT)
Dept: CARDIOLOGY CLINIC | Age: 76
End: 2021-12-21

## 2021-12-21 NOTE — TELEPHONE ENCOUNTER
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        Recommendations        recommend anticoagulation, office visit to discuss results       Conclusions        Summary        No evidence of AAA within the visualized portions of the abdominal aorta.        Recommendations        optimize medications     Spoke to patient regarding results of lower extremity doppler and abdominal ultrasound. Patient voiced understanding.

## 2022-06-07 ENCOUNTER — OFFICE VISIT (OUTPATIENT)
Dept: CARDIOLOGY CLINIC | Age: 77
End: 2022-06-07
Payer: MEDICARE

## 2022-06-07 VITALS
WEIGHT: 202 LBS | BODY MASS INDEX: 30.62 KG/M2 | SYSTOLIC BLOOD PRESSURE: 110 MMHG | DIASTOLIC BLOOD PRESSURE: 60 MMHG | HEIGHT: 68 IN | HEART RATE: 80 BPM

## 2022-06-07 DIAGNOSIS — I82.402 DEEP VEIN THROMBOSIS (DVT) OF LEFT LOWER EXTREMITY, UNSPECIFIED CHRONICITY, UNSPECIFIED VEIN (HCC): Primary | ICD-10-CM

## 2022-06-07 PROCEDURE — 1123F ACP DISCUSS/DSCN MKR DOCD: CPT | Performed by: INTERNAL MEDICINE

## 2022-06-07 PROCEDURE — 99214 OFFICE O/P EST MOD 30 MIN: CPT | Performed by: INTERNAL MEDICINE

## 2022-06-07 RX ORDER — EMPAGLIFLOZIN 25 MG/1
25 TABLET, FILM COATED ORAL DAILY
COMMUNITY
Start: 2022-04-12

## 2022-06-07 NOTE — PROGRESS NOTES
Ricky Roberts MD        OFFICE  FOLLOWUP NOTE    Chief complaints: patient is here for management of CAD, DM, HTN, DYSLPIDEMIA, COVID 19, DVT    Subjective: patient feels better, no chest pain, no shortness of breath, no dizziness, no palpitations    HPI Tahir Devlin is a 77 y. o.year old who  has a past medical history of Abdominal ultrasound, BPH (benign prostatic hyperplasia), Diabetes mellitus ([address] History of Doppler ultrasound, History of exercise stress test, Hx of Doppler echocardiogram, Hx of exercise stress test, Hypertension, Lower extremity doppler, and 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 ([address] and presents for management of CAD, DM, HTN, DYSLPIDEMIA, COVID 19 which are well controlled    LAST TIME HIS venous doppler showed DVT persistent and started on eliquis  Current Outpatient Medications   Medication Sig Dispense Refill    JARDIANCE 25 MG tablet 25 mg daily       apixaban (ELIQUIS) 5 MG TABS tablet Take 1 tablet by mouth 2 times daily 180 tablet 1    metoprolol succinate (TOPROL XL) 25 MG extended release tablet Take 1 tablet by mouth daily 90 tablet 3    Dulaglutide (TRULICITY) 8.21 PD/5.8NW SOPN Inject 0.75 mg into the skin once a week      SYNTHROID 50 MCG tablet Take 75 mcg by mouth Daily       lisinopril (PRINIVIL;ZESTRIL) 5 MG tablet Take 1 tablet by mouth daily (Patient taking differently: Take 10 mg by mouth daily ) 90 tablet 3    canagliflozin (INVOKANA) 300 MG TABS tablet Take 300 mg by mouth every morning (before breakfast)      atorvastatin (LIPITOR) 80 MG tablet Take 1 tablet by mouth daily 30 tablet 3    glimepiride (AMARYL) 4 MG tablet Take 4 mg by mouth every morning (before breakfast).  METFORMIN HCL PO Take 1,000 mg by mouth 2 times daily. No current facility-administered medications for this visit. Allergies: Patient has no known allergies.   Past Medical History:   Diagnosis Date    Abdominal ultrasound 12/15/2021    Normal study    BPH (benign prostatic hyperplasia) 03/13/2021    Diabetes mellitus ([address]     History of Doppler ultrasound 07/26/2018    arterial duplex-no significant disease    History of exercise stress test 11/30/2017    treadmill    Hx of Doppler echocardiogram 11/30/2017    EF50-60%,normal    Hx of exercise stress test 12/06/2021    Treadmill.  Hypertension     Lower extremity doppler 12/15/2021    Partially occlusive chronic DVT in the left proximal CFV, proximal deep femoral vein, mid femoral vein, distal femoral vein, and GSV SFJ, GSV mid thigh, and GSV knee.     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 ([address] 11/10/2017     Past Surgical History:   Procedure Laterality Date    BACK SURGERY      X 2    COLONOSCOPY  4/3/14    divertics, rectal polyps    ENDOSCOPY, COLON, DIAGNOSTIC      ENDOSCOPY, COLON, DIAGNOSTIC  05/03/2017    dysphagia, hiatal hernia    TONSILLECTOMY       Family History   Problem Relation Age of Onset    Heart Disease Father     Heart Disease Brother      Social History     Tobacco Use    Smoking status: Never Smoker    Smokeless tobacco: Never Used   Substance Use Topics    Alcohol use: Yes     Comment: occasionally      [unfilled]  Review of Systems:   · Constitutional: No Fever or Weight Loss   · Eyes: No Decreased Vision  · ENT: No Headaches, Hearing Loss or Vertigo  · Cardiovascular: No chest pain, dyspnea on exertion, palpitations or loss of consciousness  · Respiratory: No cough or wheezing    · Gastrointestinal: No abdominal pain, appetite loss, blood in stools, constipation, diarrhea or heartburn  · Genitourinary: No dysuria, trouble voiding, or hematuria  · Musculoskeletal:  No gait disturbance, weakness or joint complaints  · Integumentary: No rash or pruritis  · Neurological: No TIA or stroke symptoms  · Psychiatric: No anxiety or depression  · Endocrine: No malaise, fatigue or temperature intolerance  · Hematologic/Lymphatic: No bleeding problems, blood clots or swollen lymph nodes  · Allergic/Immunologic: No nasal congestion or hives  All systems negative except as marked. Objective:  /60   Pulse 80   Ht 5' 8\" (1.727 m)   Wt 202 lb (91.6 kg)   BMI 30.71 kg/m²   Wt Readings from Last 3 Encounters:   06/07/22 202 lb (91.6 kg)   12/03/21 202 lb 12.8 oz (92 kg)   06/03/21 206 lb 6.4 oz (93.6 kg)     Body mass index is 30.71 kg/m². GENERAL - Alert, oriented, pleasant, in no apparent distress,normal grooming  HEENT - pupils are intact, cornea intact, conjunctive normal color, ears are normal in exam,  Neck - Supple. No jugular venous distention noted. No carotid bruits, no apical -carotid delay  Respiratory:  Normal breath sounds, No respiratory distress, No wheezing, No chest tenderness. ,no use of accessory muscles, diaphragm movement is normal  Cardiovascular: (PMI) apex non displaced,no lifts no thrills, no s3,no s4, Normal heart rate, Normal rhythm, No murmurs, No rubs, No gallops. Carotid arteries pulse and amplitude are normal no bruit, no abdominal bruit noted ( normal abdominal aorta ausculation),   Extremities - No cyanosis, clubbing, or significant edema, no varicose veins    Abdomen - No masses, tenderness, or organomegaly, no hepato-splenomegally, no bruits  Musculoskeletal - No significant edema, no kyphosis or scoliosis, no deformity in any extremity noted, muscle strength and tone are normal  Skin: no ulcer,no scar,no stasis dermatitis   Neurologic - alert oriented times 3,Cranial nerves II through XII are grossly intact. There were no gross focal neurologic abnormalities.    Psychiatric: normal mood and affect    Lab Results   Component Value Date    TROPONINI <0.006 02/12/2014     BNP:  No results found for: BNP  PT/INR:  No results found for: PTINR  Lab Results   Component Value Date    LABA1C 7.0 (H) 12/18/2020    LABA1C 7.4 (H) 09/18/2020     Lab Results   Component Value Date    CHOL 100 12/18/2020    TRIG 111 12/18/2020    HDL 33 (L) 12/18/2020    LDLCALC 43 02/09/2019    LDLDIRECT 48 12/18/2020     Lab Results   Component Value Date    ALT 76 (H) 12/18/2020    AST 51 (H) 12/18/2020     TSH:  No results found for: TSH    Impression:  Adan Dixon is a 77 y. o.year old who  has a past medical history of Abdominal ultrasound, BPH (benign prostatic hyperplasia), Diabetes mellitus ([facility] History of Doppler ultrasound, History of exercise stress test, Hx of Doppler echocardiogram, Hx of exercise stress test, Hypertension, Lower extremity doppler, and 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 ([address] and presents with     Plan:  1. CAD: continue baby aspirin, off  EFFEINT, continue statins, ACEinhibitors, betablockers,H/O PCI OF LAD in 2017, treadmill stress test ordered  2. COVID 19; recovered 2 yr, HE WAS ADMITTED FOR dehydration and later on tested his own antibodies which was positive. 3. Left leg DVT:treated with anticoagulants, recheck venous doppler. He may need venous thrombectomy  4. DM: stable continue invokana and amaryl, recommeded patient to ask PMD about Lucero Spatz is 7.2  5. Dyslipidemia: continue statins. LDL was 34  6. AAA screening  7. HTN: stable, continue lopressor and lisinopril  8. Hypothyroidism: CPONTINUE SYNTHOIRD  All labs, medications and tests reviewed, continue all other medications of all above medical condition listed as is.

## 2022-06-20 RX ORDER — METOPROLOL SUCCINATE 25 MG/1
25 TABLET, EXTENDED RELEASE ORAL DAILY
Qty: 90 TABLET | Refills: 3 | Status: SHIPPED | OUTPATIENT
Start: 2022-06-20

## 2022-06-22 ENCOUNTER — PROCEDURE VISIT (OUTPATIENT)
Dept: CARDIOLOGY CLINIC | Age: 77
End: 2022-06-22
Payer: MEDICARE

## 2022-06-22 DIAGNOSIS — I82.402 DEEP VEIN THROMBOSIS (DVT) OF LEFT LOWER EXTREMITY, UNSPECIFIED CHRONICITY, UNSPECIFIED VEIN (HCC): ICD-10-CM

## 2022-06-22 DIAGNOSIS — I82.412 ACUTE DEEP VEIN THROMBOSIS (DVT) OF FEMORAL VEIN OF LEFT LOWER EXTREMITY (HCC): ICD-10-CM

## 2022-06-22 PROCEDURE — 93971 EXTREMITY STUDY: CPT | Performed by: INTERNAL MEDICINE

## 2022-06-27 ENCOUNTER — TELEPHONE (OUTPATIENT)
Dept: CARDIOLOGY CLINIC | Age: 77
End: 2022-06-27

## 2022-06-27 NOTE — TELEPHONE ENCOUNTER
Evidence of partially occlusive chronic DVT in the Left proximal CFV, PFV,    Proximal FV, and Popliteal V, and partially occlusive chronic SVT in the    Left GSV SFJ, GSV Mid thigh, and distal SSV.        Evidence of occlusive chronic DVT in the Left mid and distal FV, and    occlusive chronic SVT in the Left GSV Knee and Mid Calf.        Possible chronic, non-occlusive DVT of the mid PTV.      Recommendations        office visit to discuss results     Spoke with patient regarding results of doppler. Patient voiced understanding. Made an appointment for 6/28/22.

## 2022-06-28 ENCOUNTER — OFFICE VISIT (OUTPATIENT)
Dept: CARDIOLOGY CLINIC | Age: 77
End: 2022-06-28
Payer: MEDICARE

## 2022-06-28 VITALS
SYSTOLIC BLOOD PRESSURE: 118 MMHG | HEIGHT: 68 IN | BODY MASS INDEX: 30.77 KG/M2 | WEIGHT: 203 LBS | HEART RATE: 68 BPM | DIASTOLIC BLOOD PRESSURE: 60 MMHG

## 2022-06-28 DIAGNOSIS — I10 PRIMARY HYPERTENSION: ICD-10-CM

## 2022-06-28 DIAGNOSIS — I25.10 CORONARY ARTERY DISEASE INVOLVING NATIVE CORONARY ARTERY OF NATIVE HEART WITHOUT ANGINA PECTORIS: Primary | ICD-10-CM

## 2022-06-28 DIAGNOSIS — E78.5 DYSLIPIDEMIA: ICD-10-CM

## 2022-06-28 DIAGNOSIS — Z86.718 H/O DEEP VENOUS THROMBOSIS: ICD-10-CM

## 2022-06-28 PROCEDURE — 99214 OFFICE O/P EST MOD 30 MIN: CPT | Performed by: NURSE PRACTITIONER

## 2022-06-28 PROCEDURE — 1123F ACP DISCUSS/DSCN MKR DOCD: CPT | Performed by: NURSE PRACTITIONER

## 2022-06-28 NOTE — PROGRESS NOTES
Sharmilanela Narvaezlinda  [facility] [address], [address] [facility],[address]  Phone: [phone]    Fax [phone]                  Tye Kanner, MD, Yenifer Maier MD, [address] [facility], MD, MD Leah Chaudhry, MD Shay Hinds, APRN      Louisa Flores, APRN  Renée Michel, APRN     Alondra Pope, APRN        Cardiology Progress Note      6/28/2022    RE: Merna Rodriguez  (1945)                             Primary cardiologist: Dr. Leah Wilson       Subjective:  CC:   1. Coronary artery disease involving native coronary artery of native heart without angina pectoris    2. H/O deep venous thrombosis    3. Dyslipidemia    4. Primary hypertension        HPI: Merna Rodriguez, who is a  77y.o. year old male with a past medical history as listed below. Patient presents to the office for follow up on CAD (PCI of LAD in 2017), HTN, H/O DVT, and hyperlipidemia. Patient is  an active male who walks regularly. Patient is  compliant with medications. Patient denies any chest pain, shortness of breath, dizziness, syncope, or palpitations. Past Medical History:   Diagnosis Date    Abdominal ultrasound 12/15/2021    Normal study    BPH (benign prostatic hyperplasia) 03/13/2021    Diabetes mellitus ([facility]     History of Doppler ultrasound 07/26/2018    arterial duplex-no significant disease    History of exercise stress test 11/30/2017    treadmill    Hx of Doppler echocardiogram 11/30/2017    EF50-60%,normal    Hx of Doppler ultrasound 06/22/2022    Evidence of partially occlusive chronic DVT in the left proximal CFV, PFV. Proximal FV, and popliteal V, and partially occlusive chronic SVT in the left GSV SFJ, GSV mid thigh,and distal SSV. Occlusive chronic DVT in the let mid and distal FV and occlusive chronic SVT in the left GSV knee and mid calf. Possible chronic, onon occlusive DVT of the mid PTV.     Hx of exercise stress test 12/06/2021    Treadmill.  Hypertension     Lower extremity doppler 12/15/2021    Partially occlusive chronic DVT in the left proximal CFV, proximal deep femoral vein, mid femoral vein, distal femoral vein, and GSV SFJ, GSV mid thigh, and GSV knee.  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 ([facility]) 11/10/2017       Current Outpatient Medications   Medication Sig Dispense Refill    metoprolol succinate (TOPROL XL) 25 MG extended release tablet Take 1 tablet by mouth daily 90 tablet 3    apixaban (ELIQUIS) 5 MG TABS tablet Take 1 tablet by mouth 2 times daily 180 tablet 3    JARDIANCE 25 MG tablet 25 mg daily       Dulaglutide (TRULICITY) 3.70 ST/6.7IS SOPN Inject 0.75 mg into the skin once a week      SYNTHROID 50 MCG tablet Take 75 mcg by mouth Daily       lisinopril (PRINIVIL;ZESTRIL) 5 MG tablet Take 1 tablet by mouth daily (Patient taking differently: Take 10 mg by mouth daily ) 90 tablet 3    canagliflozin (INVOKANA) 300 MG TABS tablet Take 300 mg by mouth every morning (before breakfast)      atorvastatin (LIPITOR) 80 MG tablet Take 1 tablet by mouth daily 30 tablet 3    glimepiride (AMARYL) 4 MG tablet Take 4 mg by mouth every morning (before breakfast).  METFORMIN HCL PO Take 1,000 mg by mouth 2 times daily. No current facility-administered medications for this visit. Review of Systems:  Review of Systems   Cardiovascular: Negative for chest pain, palpitations and leg swelling. Musculoskeletal: Negative. Skin: Negative. Neurological: Negative for dizziness and weakness. All other systems reviewed and are negative. Objective:      Physical Exam:  /60   Pulse 68   Ht 5' 8.4\" (1.737 m)   Wt 203 lb (92.1 kg)   BMI 30.51 kg/m²   Wt Readings from Last 3 Encounters:   06/28/22 203 lb (92.1 kg)   06/07/22 202 lb (91.6 kg)   12/03/21 202 lb 12.8 oz (92 kg)     Body mass index is 30.51 kg/m².     Physical exam:  Physical Exam  Constitutional: Appearance: He is well-developed. Cardiovascular:      Rate and Rhythm: Normal rate and regular rhythm. Pulses: Intact distal pulses. Dorsalis pedis pulses are 2+ on the right side and 2+ on the left side. Posterior tibial pulses are 2+ on the right side and 2+ on the left side. Heart sounds: Normal heart sounds, S1 normal and S2 normal.   Pulmonary:      Effort: Pulmonary effort is normal.      Breath sounds: Normal breath sounds. Musculoskeletal:         General: Normal range of motion. Skin:     General: Skin is warm and dry. Neurological:      Mental Status: He is alert and oriented to person, place, and time. DATA:  Lab Results   Component Value Date    TROPONINI <0.006 02/12/2014     BNP:  No results found for: BNP  PT/INR:  No results found for: PTINR  Lab Results   Component Value Date    LABA1C 7.0 (H) 12/18/2020    LABA1C 7.4 (H) 09/18/2020     Lab Results   Component Value Date    CHOL 100 12/18/2020    TRIG 111 12/18/2020    HDL 33 (L) 12/18/2020    LDLCALC 43 02/09/2019    LDLDIRECT 48 12/18/2020     Lab Results   Component Value Date    ALT 76 (H) 12/18/2020    AST 51 (H) 12/18/2020     TSH:  No results found for: TSH    Vitals:    06/28/22 0829   BP: 118/60   Pulse: 68       Echo:11/30/17   Normal left ventricle structure and systolic function. Ejection fraction is   visually estimated at 50-60%. Grade I diastolic dysfunction. No significant valvular disease noted. No evidence of pericardial effusion. Essentially unremarkable echo. Cath:11/10/17  100% mid LAD occlusion, s/p primary PCI of LAD with BENNY     Stress test:12/15/21  No ischemia       The ASCVD Risk score (Gunner Porter et al., 2013) failed to calculate for the following reasons:     The valid total cholesterol range is 130 to 320 mg/dL      Assessment/ Plan:       CAD (coronary artery disease)  -PCI of LAD in 2017.     Primary and secondary prevention discussed with patient: -Low sodium cardiac diet              -exercise 30 min a day three days a week  Continue current medications lisinopril, Lipitor, Toprol, ASA     Dyslipidemia  -At or near goal Yes    -He is to continue current medications (Lipitor 80 mg) Hepatic function panel WNL. No abdominal pain. No myalgias.      -The nature of cardiac risk has been fully discussed with this patient. I have made him aware of his LDL target goal given his cardiovascular risk analysis. I have discussed the appropriate diet. The need for lifelong compliance in order to reduce risk is stressed. A regular exercise program is recommended to help achieve and maintain normal body weight, fitness and improve lipid balance. A written copy of a low fat, low cholesterol diet has been given to the patient.      Hypertension  -Stable, continue lisinopril 5 mg daily and Toprol-XL 25 mg daily.     H/O deep venous thrombosis  -Chronic DVT of left mid and distal FV, and occlusive chronic SVT and Left GSV knee and mid calf. Patient was off Eliquis for approximately a year, then restarted in December. Appears unchanged from previous study in December of 2021. Will hold off on venogram at this time, case discussed with Dr. Jose L Sue. Patient seen, interviewed and examined. Testing was reviewed. Patient is encouraged to exercise even a brisk walk for 30 minutes at least 3 to 4 times a week. Lifestyle and risk factor modificatons discussed. Various goals are discussed and questions answered. Continue current medications. Appropriate prescriptions are addressed. Questions answered and patient verbalizes understanding. Call for any problems, questions, or concerns. Pt is to follow up in 6 months for Cardiac management    Electronically signed by Carolee Alberto.  JELANI Matthews CNP on 6/28/2022 at 3:10 PM

## 2022-06-30 ENCOUNTER — TELEPHONE (OUTPATIENT)
Dept: CARDIOLOGY CLINIC | Age: 77
End: 2022-06-30

## 2022-06-30 NOTE — TELEPHONE ENCOUNTER
Per Ministerio Friday NP: Please call patient. Patient was seen yesterday in the office, I told him I would call Dr. Christina Talavera and verify plan of care. Will hold off on venogram at this time and continue with Eliquis, case discussed with Dr. Deretha Gitelman.       Left message to call

## 2022-07-11 ENCOUNTER — OFFICE VISIT (OUTPATIENT)
Dept: CARDIOLOGY CLINIC | Age: 77
End: 2022-07-11
Payer: MEDICARE

## 2022-07-11 VITALS
WEIGHT: 198 LBS | HEART RATE: 72 BPM | BODY MASS INDEX: 30.01 KG/M2 | DIASTOLIC BLOOD PRESSURE: 60 MMHG | HEIGHT: 68 IN | SYSTOLIC BLOOD PRESSURE: 110 MMHG

## 2022-07-11 DIAGNOSIS — I25.10 CORONARY ARTERY DISEASE INVOLVING NATIVE CORONARY ARTERY OF NATIVE HEART WITHOUT ANGINA PECTORIS: Primary | ICD-10-CM

## 2022-07-11 DIAGNOSIS — Z86.718 H/O DEEP VENOUS THROMBOSIS: ICD-10-CM

## 2022-07-11 PROCEDURE — 1123F ACP DISCUSS/DSCN MKR DOCD: CPT | Performed by: INTERNAL MEDICINE

## 2022-07-11 PROCEDURE — 99214 OFFICE O/P EST MOD 30 MIN: CPT | Performed by: INTERNAL MEDICINE

## 2022-07-11 NOTE — PROGRESS NOTES
Donna Esquivel MD        OFFICE  FOLLOWUP NOTE    Chief complaints: patient is here for management of CAD, DM, HTN, [address], COVID 19, DVT      F/u on venous doppler/ left superficial vein chronic DVT and SVT in GSV    Subjective: patient feels better, no chest pain, no shortness of breath, no dizziness, no palpitations    HPI Tatiana Lopez is a 77 y. o.year old who  has a past medical history of Abdominal ultrasound, BPH (benign prostatic hyperplasia), Diabetes mellitus ([address] History of Doppler ultrasound, History of exercise stress test, Hx of Doppler echocardiogram, Hx of Doppler ultrasound, Hx of exercise stress test, Hypertension, Lower extremity doppler, and 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 ([address] and presents for management of CAD, DM, HTN, DYSLPIDEMIA, COVID 19, DVT which are well controlled      Current Outpatient Medications   Medication Sig Dispense Refill    metoprolol succinate (TOPROL XL) 25 MG extended release tablet Take 1 tablet by mouth daily 90 tablet 3    apixaban (ELIQUIS) 5 MG TABS tablet Take 1 tablet by mouth 2 times daily 180 tablet 3    JARDIANCE 25 MG tablet 25 mg daily       Dulaglutide (TRULICITY) 8.17 DN/0.3OK SOPN Inject 0.75 mg into the skin once a week      SYNTHROID 50 MCG tablet Take 75 mcg by mouth Daily       lisinopril (PRINIVIL;ZESTRIL) 5 MG tablet Take 1 tablet by mouth daily (Patient taking differently: Take 10 mg by mouth daily ) 90 tablet 3    canagliflozin (INVOKANA) 300 MG TABS tablet Take 300 mg by mouth every morning (before breakfast)      atorvastatin (LIPITOR) 80 MG tablet Take 1 tablet by mouth daily 30 tablet 3    glimepiride (AMARYL) 4 MG tablet Take 4 mg by mouth every morning (before breakfast).  METFORMIN HCL PO Take 1,000 mg by mouth 2 times daily. No current facility-administered medications for this visit. Allergies: Patient has no known allergies.   Past Medical History:   Diagnosis Date    Abdominal ultrasound 12/15/2021    Normal study    BPH (benign prostatic hyperplasia) 03/13/2021    Diabetes mellitus ([facility]     History of Doppler ultrasound 07/26/2018    arterial duplex-no significant disease    History of exercise stress test 11/30/2017    treadmill    Hx of Doppler echocardiogram 11/30/2017    EF50-60%,normal    Hx of Doppler ultrasound 06/22/2022    Evidence of partially occlusive chronic DVT in the left proximal CFV, PFV. Proximal FV, and popliteal V, and partially occlusive chronic SVT in the left GSV SFJ, GSV mid thigh,and distal SSV. Occlusive chronic DVT in the let mid and distal FV and occlusive chronic SVT in the left GSV knee and mid calf. Possible chronic, onon occlusive DVT of the mid PTV.  Hx of exercise stress test 12/06/2021    Treadmill.  Hypertension     Lower extremity doppler 12/15/2021    Partially occlusive chronic DVT in the left proximal CFV, proximal deep femoral vein, mid femoral vein, distal femoral vein, and GSV SFJ, GSV mid thigh, and GSV knee.     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 ([facility] 11/10/2017     Past Surgical History:   Procedure Laterality Date    BACK SURGERY      X 2    COLONOSCOPY  4/3/14    divertics, rectal polyps    ENDOSCOPY, COLON, DIAGNOSTIC      ENDOSCOPY, COLON, DIAGNOSTIC  05/03/2017    dysphagia, hiatal hernia    TONSILLECTOMY       Family History   Problem Relation Age of Onset    Heart Disease Father     Heart Disease Brother      Social History     Tobacco Use    Smoking status: Never Smoker    Smokeless tobacco: Never Used   Substance Use Topics    Alcohol use: Yes     Comment: occasionally      [unfilled]  Review of Systems:   · Constitutional: No Fever or Weight Loss   · Eyes: No Decreased Vision  · ENT: No Headaches, Hearing Loss or Vertigo  · Cardiovascular: No chest pain, dyspnea on exertion, palpitations or loss of consciousness  · Respiratory: No cough or wheezing    · Gastrointestinal: No abdominal pain, appetite loss, blood in stools, constipation, diarrhea or heartburn  · Genitourinary: No dysuria, trouble voiding, or hematuria  · Musculoskeletal:  No gait disturbance, weakness or joint complaints  · Integumentary: No rash or pruritis  · Neurological: No TIA or stroke symptoms  · Psychiatric: No anxiety or depression  · Endocrine: No malaise, fatigue or temperature intolerance  · Hematologic/Lymphatic: No bleeding problems, blood clots or swollen lymph nodes  · Allergic/Immunologic: No nasal congestion or hives  All systems negative except as marked. Objective:  /60   Pulse 72   Ht 5' 8\" (1.727 m)   Wt 198 lb (89.8 kg)   BMI 30.11 kg/m²   Wt Readings from Last 3 Encounters:   07/11/22 198 lb (89.8 kg)   06/28/22 203 lb (92.1 kg)   06/07/22 202 lb (91.6 kg)     Body mass index is 30.11 kg/m². GENERAL - Alert, oriented, pleasant, in no apparent distress,normal grooming  HEENT - pupils are intact, cornea intact, conjunctive normal color, ears are normal in exam,  Neck - Supple. No jugular venous distention noted. No carotid bruits, no apical -carotid delay  Respiratory:  Normal breath sounds, No respiratory distress, No wheezing, No chest tenderness. ,no use of accessory muscles, diaphragm movement is normal  Cardiovascular: (PMI) apex non displaced,no lifts no thrills, no s3,no s4, Normal heart rate, Normal rhythm, No murmurs, No rubs, No gallops. Carotid arteries pulse and amplitude are normal no bruit, no abdominal bruit noted ( normal abdominal aorta ausculation),   Extremities - No cyanosis, clubbing, or significant edema, no varicose veins    Abdomen - No masses, tenderness, or organomegaly, no hepato-splenomegally, no bruits  Musculoskeletal - No significant edema, no kyphosis or scoliosis, no deformity in any extremity noted, muscle strength and tone are normal  Skin: no ulcer,no scar,no stasis dermatitis   Neurologic - alert oriented times 3,Cranial nerves II through XII are grossly intact. There were no gross focal neurologic abnormalities. Psychiatric: normal mood and affect    Lab Results   Component Value Date/Time    TROPONINI <0.006 02/12/2014 11:44 PM     BNP:  No results found for: BNP  PT/INR:  No results found for: [facility]  Lab Results   Component Value Date    LABA1C 7.0 (H) 12/18/2020    LABA1C 7.4 (H) 09/18/2020     Lab Results   Component Value Date    CHOL 100 12/18/2020    TRIG 111 12/18/2020    HDL 33 (L) 12/18/2020    LDLCALC 43 02/09/2019    LDLDIRECT 48 12/18/2020     Lab Results   Component Value Date    ALT 76 (H) 12/18/2020    AST 51 (H) 12/18/2020     TSH:  No results found for: TSH    Impression:  Svetlana Garner is a 77 y. o.year old who  has a past medical history of Abdominal ultrasound, BPH (benign prostatic hyperplasia), Diabetes mellitus ([address] History of Doppler ultrasound, History of exercise stress test, Hx of Doppler echocardiogram, Hx of Doppler ultrasound, Hx of exercise stress test, Hypertension, Lower extremity doppler, and 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 ([address] and presents with     Plan:  1. CAD: continue baby aspirin, off  EFFEINT, continue statins, ACEinhibitors, betablockers,H/O PCI OF LAD in 2017, treadmill stress test ordered  2. COVID 19; recovered 2 yr, HE WAS ADMITTED FOR dehydration and later on tested his own antibodies which was positive. 3. Chronic Left leg DVT and SVT,:treated with anticoagulants, will continue eliquis and defer venogram and thrombectomy at this time since there is no leg swelling or ulcers. 4. DM: stable continue invokana and amaryl, recommeded patient to ask PMD about Lenn Chandni is 7.2  5. Dyslipidemia: continue statins. LDL was 34  6. AAA screening  7. HTN: stable, continue lopressor and lisinopril  8. Hypothyroidism: CPONTINUE SYNTHOIRD  All labs, medications and tests reviewed, continue all other medications of all above medical condition listed as is.

## 2022-12-14 ENCOUNTER — TELEPHONE (OUTPATIENT)
Dept: CARDIOLOGY CLINIC | Age: 77
End: 2022-12-14

## 2022-12-14 DIAGNOSIS — M79.604 PAIN OF RIGHT LOWER EXTREMITY: Primary | ICD-10-CM

## 2022-12-14 NOTE — TELEPHONE ENCOUNTER
Significant RT leg pain yesterday applied heat and it went away for the most part, does not wear compression stocking. Does elevate when available. Took an extra eliquis yesterday. States no swelling or discoloration.  HX of DVT in L Leg

## 2022-12-14 NOTE — TELEPHONE ENCOUNTER
Patient called to report yesterday he had a issue with right leg , had signifigant pain for most of the day , He did apptly heat and the pain went away , he is concerned has a history of PE

## 2022-12-15 ENCOUNTER — PROCEDURE VISIT (OUTPATIENT)
Dept: CARDIOLOGY CLINIC | Age: 77
End: 2022-12-15

## 2022-12-15 DIAGNOSIS — M79.604 PAIN OF RIGHT LOWER EXTREMITY: ICD-10-CM

## 2022-12-20 ENCOUNTER — TELEPHONE (OUTPATIENT)
Dept: CARDIOLOGY CLINIC | Age: 77
End: 2022-12-20

## 2023-01-06 ENCOUNTER — OFFICE VISIT (OUTPATIENT)
Dept: CARDIOLOGY CLINIC | Age: 78
End: 2023-01-06
Payer: MEDICARE

## 2023-01-06 VITALS
HEART RATE: 80 BPM | WEIGHT: 195 LBS | SYSTOLIC BLOOD PRESSURE: 110 MMHG | BODY MASS INDEX: 29.55 KG/M2 | HEIGHT: 68 IN | DIASTOLIC BLOOD PRESSURE: 60 MMHG

## 2023-01-06 DIAGNOSIS — E78.5 DYSLIPIDEMIA: Primary | ICD-10-CM

## 2023-01-06 PROCEDURE — 1123F ACP DISCUSS/DSCN MKR DOCD: CPT | Performed by: INTERNAL MEDICINE

## 2023-01-06 PROCEDURE — 3078F DIAST BP <80 MM HG: CPT | Performed by: INTERNAL MEDICINE

## 2023-01-06 PROCEDURE — 99214 OFFICE O/P EST MOD 30 MIN: CPT | Performed by: INTERNAL MEDICINE

## 2023-01-06 PROCEDURE — 3074F SYST BP LT 130 MM HG: CPT | Performed by: INTERNAL MEDICINE

## 2023-01-06 NOTE — PATIENT INSTRUCTIONS
Please be informed that if you contact our office outside of normal business hours the physician on call cannot help with any scheduling or rescheduling issues, procedure instruction questions or any type of medication issue. We advise you for any urgent/emergency that you go to the nearest emergency room! PLEASE CALL OUR OFFICE DURING NORMAL BUSINESS HOURS    Monday - Friday   8 am to 5 pm    Maral[place]anna Leung 12: [phone]    [place]:  [phone]      **It is YOUR responsibilty to bring medication bottles and/or updated medication list to [address] [place] [address]. This will allow us to better serve you and all your healthcare needs**      Thank you for allowing us to care for you today! We want to ensure we can follow your treatment plan and we strive to give you the best outcomes and experience possible. If you ever have a life threatening emergency and call 911 - for an ambulance (EMS)   Our providers can only care for you at:   [facility] or [facility]. Even if you have someone take you or you drive yourself we can only care for you in a [facility] facility. Our providers are not setup at the other healthcare locations!

## 2023-01-06 NOTE — PROGRESS NOTES
Jeannette Hankins MD        OFFICE  FOLLOWUP NOTE    Chief complaints: patient is here for management of CAD, DM, HTN, DYSLPIDEMIA, COVID 19, DVT  Subjective: patient feels better, no chest pain, no shortness of breath, no dizziness, no palpitations    HPI Ailyn Mata is a 69 y. o.year old who  has a past medical history of Abdominal ultrasound, BPH (benign prostatic hyperplasia), Diabetes mellitus ([address] History of Doppler ultrasound, History of exercise stress test, Hx of Doppler echocardiogram, Hx of Doppler ultrasound, Hx of exercise stress test, Hypertension, Lower extremity doppler, and 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 ([address] and presents for management of CAD, DM, HTN, DYSLPIDEMIA, COVID 19, DVT which are well controlled      Current Outpatient Medications   Medication Sig Dispense Refill    apixaban (ELIQUIS) 5 MG TABS tablet Take 1 tablet by mouth 2 times daily 180 tablet 3    metoprolol succinate (TOPROL XL) 25 MG extended release tablet Take 1 tablet by mouth daily 90 tablet 3    JARDIANCE 25 MG tablet 25 mg daily       Dulaglutide (TRULICITY) 8.62 QP/3.3XY SOPN Inject 0.75 mg into the skin once a week      SYNTHROID 50 MCG tablet Take 75 mcg by mouth Daily       lisinopril (PRINIVIL;ZESTRIL) 5 MG tablet Take 1 tablet by mouth daily (Patient taking differently: Take 10 mg by mouth daily) 90 tablet 3    atorvastatin (LIPITOR) 80 MG tablet Take 1 tablet by mouth daily 30 tablet 3    glimepiride (AMARYL) 4 MG tablet Take 4 mg by mouth every morning (before breakfast). METFORMIN HCL PO Take 1,000 mg by mouth 2 times daily. No current facility-administered medications for this visit. Allergies: Patient has no known allergies.   Past Medical History:   Diagnosis Date    Abdominal ultrasound 12/15/2021    Normal study    BPH (benign prostatic hyperplasia) 03/13/2021    Diabetes mellitus ([address]     History of Doppler ultrasound 07/26/2018    arterial duplex-no significant disease    History of exercise stress test 11/30/2017    treadmill    Hx of Doppler echocardiogram 11/30/2017    EF50-60%,normal    Hx of Doppler ultrasound 06/22/2022    Evidence of partially occlusive chronic DVT in the left proximal CFV, PFV. Proximal FV, and popliteal V, and partially occlusive chronic SVT in the left GSV SFJ, GSV mid thigh,and distal SSV. Occlusive chronic DVT in the let mid and distal FV and occlusive chronic SVT in the left GSV knee and mid calf. Possible chronic, onon occlusive DVT of the mid PTV. Hx of exercise stress test 12/06/2021    Treadmill. Hypertension     Lower extremity doppler 12/15/2021    Partially occlusive chronic DVT in the left proximal CFV, proximal deep femoral vein, mid femoral vein, distal femoral vein, and GSV SFJ, GSV mid thigh, and GSV knee.     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 ([facility] 11/10/2017     Past Surgical History:   Procedure Laterality Date    BACK SURGERY      X 2    COLONOSCOPY  4/3/14    divertics, rectal polyps    ENDOSCOPY, COLON, DIAGNOSTIC      ENDOSCOPY, COLON, DIAGNOSTIC  05/03/2017    dysphagia, hiatal hernia    TONSILLECTOMY       Family History   Problem Relation Age of Onset    Heart Disease Father     Heart Disease Brother      Social History     Tobacco Use    Smoking status: Never    Smokeless tobacco: Never   Substance Use Topics    Alcohol use: Yes     Comment: occasionally      [unfilled]  Review of Systems:   Constitutional: No Fever or Weight Loss   Eyes: No Decreased Vision  ENT: No Headaches, Hearing Loss or Vertigo  Cardiovascular: No chest pain, dyspnea on exertion, palpitations or loss of consciousness  Respiratory: No cough or wheezing    Gastrointestinal: No abdominal pain, appetite loss, blood in stools, constipation, diarrhea or heartburn  Genitourinary: No dysuria, trouble voiding, or hematuria  Musculoskeletal:  No gait disturbance, weakness or joint complaints  Integumentary: No rash or pruritis  Neurological: No TIA or stroke symptoms  Psychiatric: No anxiety or depression  Endocrine: No malaise, fatigue or temperature intolerance  Hematologic/Lymphatic: No bleeding problems, blood clots or swollen lymph nodes  Allergic/Immunologic: No nasal congestion or hives  All systems negative except as marked. Objective:  /60   Pulse 80   Ht 5' 8\" (1.727 m)   Wt 195 lb (88.5 kg)   BMI 29.65 kg/m²   Wt Readings from Last 3 Encounters:   01/06/23 195 lb (88.5 kg)   07/11/22 198 lb (89.8 kg)   06/28/22 203 lb (92.1 kg)     Body mass index is 29.65 kg/m². GENERAL - Alert, oriented, pleasant, in no apparent distress,normal grooming  HEENT - pupils are intact, cornea intact, conjunctive normal color, ears are normal in exam,  Neck - Supple. No jugular venous distention noted. No carotid bruits, no apical -carotid delay  Respiratory:  Normal breath sounds, No respiratory distress, No wheezing, No chest tenderness. ,no use of accessory muscles, diaphragm movement is normal  Cardiovascular: (PMI) apex non displaced,no lifts no thrills, no s3,no s4, Normal heart rate, Normal rhythm, No murmurs, No rubs, No gallops. Carotid arteries pulse and amplitude are normal no bruit, no abdominal bruit noted ( normal abdominal aorta ausculation),   Extremities - No cyanosis, clubbing, or significant edema, no varicose veins    Abdomen - No masses, tenderness, or organomegaly, no hepato-splenomegally, no bruits  Musculoskeletal - No significant edema, no kyphosis or scoliosis, no deformity in any extremity noted, muscle strength and tone are normal  Skin: no ulcer,no scar,no stasis dermatitis   Neurologic - alert oriented times 3,Cranial nerves II through XII are grossly intact. There were no gross focal neurologic abnormalities.    Psychiatric: normal mood and affect    Lab Results   Component Value Date/Time    TROPONINI <0.006 02/12/2014 11:44 PM     BNP:  No results found for: BNP  PT/INR:  No results found for: PTINR  Lab Results   Component Value Date    LABA1C 7.0 (H) 12/18/2020    LABA1C 7.4 (H) 09/18/2020     Lab Results   Component Value Date    CHOL 100 12/18/2020    TRIG 111 12/18/2020    HDL 33 (L) 12/18/2020    LDLCALC 43 02/09/2019    LDLDIRECT 48 12/18/2020     Lab Results   Component Value Date    ALT 76 (H) 12/18/2020    AST 51 (H) 12/18/2020     TSH:  No results found for: TSH    Impression:  Leela Carter is a 69 y. o.year old who  has a past medical history of Abdominal ultrasound, BPH (benign prostatic hyperplasia), Diabetes mellitus ([address] History of Doppler ultrasound, History of exercise stress test, Hx of Doppler echocardiogram, Hx of Doppler ultrasound, Hx of exercise stress test, Hypertension, Lower extremity doppler, and 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 ([address] and presents with     Plan:  CAD: continue baby aspirin, off  EFFEINT, continue statins, ACEinhibitors, betablockers,H/O PCI OF LAD in 2017, treadmill stress test was normal last yr. COVID 19; recovered 3 yr, HE WAS ADMITTED FOR dehydration and later on tested his own antibodies which was positive. Chronic Left leg DVT and SVT,STILL PARTIALLY OCCLUDED as per ultrasound 6/22,:treated with anticoagulants, will continue eliquis and defer venogram and thrombectomy at this time since there is no leg swelling or ulcers. DM: stable continue invokana and amaryl, recommeded patient to ask PMD about Balbina Robles is 7.2  Dyslipidemia: continue statins. LDL was 34, recheck lipids  AAA screening  HTN: stable, continue lopressor and lisinopril  Hypothyroidism: CPONTINUE SYNTHRIOD  All labs, medications and tests reviewed, continue all other medications of all above medical condition listed as is.

## 2023-07-10 ENCOUNTER — OFFICE VISIT (OUTPATIENT)
Dept: CARDIOLOGY CLINIC | Age: 78
End: 2023-07-10
Payer: MEDICARE

## 2023-07-10 VITALS
BODY MASS INDEX: 29.04 KG/M2 | OXYGEN SATURATION: 95 % | SYSTOLIC BLOOD PRESSURE: 106 MMHG | HEART RATE: 66 BPM | DIASTOLIC BLOOD PRESSURE: 62 MMHG | WEIGHT: 191.6 LBS | HEIGHT: 68 IN

## 2023-07-10 DIAGNOSIS — I10 PRIMARY HYPERTENSION: ICD-10-CM

## 2023-07-10 DIAGNOSIS — Z86.718 H/O DEEP VENOUS THROMBOSIS: ICD-10-CM

## 2023-07-10 DIAGNOSIS — Z13.6 SCREENING FOR AAA (AORTIC ABDOMINAL ANEURYSM): ICD-10-CM

## 2023-07-10 DIAGNOSIS — E78.5 DYSLIPIDEMIA: ICD-10-CM

## 2023-07-10 DIAGNOSIS — I82.402 DEEP VEIN THROMBOSIS (DVT) OF LEFT LOWER EXTREMITY, UNSPECIFIED CHRONICITY, UNSPECIFIED VEIN (HCC): ICD-10-CM

## 2023-07-10 DIAGNOSIS — I25.10 CORONARY ARTERY DISEASE INVOLVING NATIVE CORONARY ARTERY OF NATIVE HEART WITHOUT ANGINA PECTORIS: Primary | ICD-10-CM

## 2023-07-10 DIAGNOSIS — M79.604 PAIN OF RIGHT LOWER EXTREMITY: ICD-10-CM

## 2023-07-10 DIAGNOSIS — I82.412 ACUTE DEEP VEIN THROMBOSIS (DVT) OF FEMORAL VEIN OF LEFT LOWER EXTREMITY (HCC): ICD-10-CM

## 2023-07-10 PROCEDURE — 93000 ELECTROCARDIOGRAM COMPLETE: CPT | Performed by: INTERNAL MEDICINE

## 2023-07-10 PROCEDURE — 3078F DIAST BP <80 MM HG: CPT | Performed by: INTERNAL MEDICINE

## 2023-07-10 PROCEDURE — 3074F SYST BP LT 130 MM HG: CPT | Performed by: INTERNAL MEDICINE

## 2023-07-10 PROCEDURE — 99214 OFFICE O/P EST MOD 30 MIN: CPT | Performed by: INTERNAL MEDICINE

## 2023-07-10 PROCEDURE — 1123F ACP DISCUSS/DSCN MKR DOCD: CPT | Performed by: INTERNAL MEDICINE

## 2023-07-10 NOTE — PATIENT INSTRUCTIONS
Please be informed that if you contact our office outside of normal business hours the physician on call cannot help with any scheduling or rescheduling issues, procedure instruction questions or any type of medication issue. We advise you for any urgent/emergency that you go to the nearest emergency room! PLEASE CALL OUR OFFICE DURING NORMAL BUSINESS HOURS    Monday - Friday   8 am to 5 pm    [place]: [address]: [phone]    [place]:  [phone]    **It is YOUR responsibilty to bring medication bottles and/or updated medication list to [address] [place] [address]. This will allow us to better serve you and all your healthcare needs**    [facility] Locations - No appointment necessary. Sites open Monday to Friday. Call your preferred location for test preparation, business   hours and other information you need. [employer] accepts [employer]. [address] [place] [address]. [address] [place], [address]  Phone: [phone]     We are committed to providing you the best care possible. If you receive a survey after visiting one of our offices, please take time to share your experience concerning your physician office visit. These surveys are confidential and no health information about you is shared. We are eager to improve for you and we are counting on your feedback to help make that happen.

## 2023-07-10 NOTE — PROGRESS NOTES
Hazel Mcdaniel MD        OFFICE  FOLLOWUP NOTE    Chief complaints: patient is here for management of  CAD, DM, HTN, DYSLPIDEMIA, COVID 19, DVT    Subjective: patient feels better, no chest pain, no shortness of breath, no dizziness, no palpitations    HPI Varsha Umanzor is a 69 y. o.year old who  has a past medical history of Abdominal ultrasound, BPH (benign prostatic hyperplasia), Diabetes mellitus ([address]), History of Doppler ultrasound, History of exercise stress test, Hx of Doppler echocardiogram, Hx of Doppler ultrasound, Hx of exercise stress test, Hypertension, Lower extremity doppler, and 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 and presents for management of  CAD, DM, HTN, DYSLPIDEMIA, COVID 19, DVT, which are well controlled      Current Outpatient Medications   Medication Sig Dispense Refill    apixaban (ELIQUIS) 5 MG TABS tablet Take 1 tablet by mouth 2 times daily 180 tablet 3    metoprolol succinate (TOPROL XL) 25 MG extended release tablet Take 1 tablet by mouth daily 90 tablet 3    JARDIANCE 25 MG tablet 1 tablet daily      Dulaglutide (TRULICITY) 0.28 IL/7.9XY SOPN Inject 0.75 mg into the skin once a week      SYNTHROID 50 MCG tablet Take 1 tablet by mouth Daily      lisinopril (PRINIVIL;ZESTRIL) 5 MG tablet Take 1 tablet by mouth daily (Patient taking differently: Take 2 tablets by mouth daily) 90 tablet 3    atorvastatin (LIPITOR) 80 MG tablet Take 1 tablet by mouth daily 30 tablet 3    glimepiride (AMARYL) 2 MG tablet Take 1 tablet by mouth daily      METFORMIN HCL PO Take 1,000 mg by mouth 2 times daily. No current facility-administered medications for this visit. Allergies: Patient has no known allergies.   Past Medical History:   Diagnosis Date    Abdominal ultrasound 12/15/2021    Normal study    BPH (benign prostatic hyperplasia) 03/13/2021    Diabetes mellitus ([address])     History of Doppler ultrasound 07/26/2018    arterial duplex-no significant disease

## 2023-07-24 ENCOUNTER — PROCEDURE VISIT (OUTPATIENT)
Dept: CARDIOLOGY CLINIC | Age: 78
End: 2023-07-24

## 2023-07-24 DIAGNOSIS — R94.31 ABNORMAL EKG: Primary | ICD-10-CM

## 2023-07-24 DIAGNOSIS — I25.10 CORONARY ARTERY DISEASE INVOLVING NATIVE CORONARY ARTERY OF NATIVE HEART WITHOUT ANGINA PECTORIS: ICD-10-CM

## 2023-07-24 LAB
LV EF: 54 %
LVEF MODALITY: NORMAL

## 2023-08-07 ENCOUNTER — HOSPITAL ENCOUNTER (OUTPATIENT)
Age: 78
Discharge: HOME | End: 2023-08-07
Payer: MEDICARE

## 2023-08-07 ENCOUNTER — OFFICE VISIT (OUTPATIENT)
Dept: CARDIOLOGY CLINIC | Age: 78
End: 2023-08-07
Payer: MEDICARE

## 2023-08-07 ENCOUNTER — TELEPHONE (OUTPATIENT)
Dept: CARDIOLOGY CLINIC | Age: 78
End: 2023-08-07

## 2023-08-07 ENCOUNTER — HOSPITAL ENCOUNTER (OUTPATIENT)
Dept: GENERAL RADIOLOGY | Age: 78
Discharge: HOME | End: 2023-08-07
Payer: MEDICARE

## 2023-08-07 VITALS
HEIGHT: 68 IN | DIASTOLIC BLOOD PRESSURE: 66 MMHG | HEART RATE: 80 BPM | WEIGHT: 193 LBS | OXYGEN SATURATION: 94 % | BODY MASS INDEX: 29.25 KG/M2 | SYSTOLIC BLOOD PRESSURE: 104 MMHG

## 2023-08-07 DIAGNOSIS — E78.5 DYSLIPIDEMIA: ICD-10-CM

## 2023-08-07 DIAGNOSIS — Z01.810 PRE-OPERATIVE CARDIOVASCULAR EXAMINATION: Primary | ICD-10-CM

## 2023-08-07 DIAGNOSIS — R94.31 ABNORMAL EKG: ICD-10-CM

## 2023-08-07 DIAGNOSIS — Z01.810 PRE-OPERATIVE CARDIOVASCULAR EXAMINATION: ICD-10-CM

## 2023-08-07 DIAGNOSIS — R06.02 SOB (SHORTNESS OF BREATH): ICD-10-CM

## 2023-08-07 DIAGNOSIS — I25.10 CORONARY ARTERY DISEASE INVOLVING NATIVE CORONARY ARTERY OF NATIVE HEART WITHOUT ANGINA PECTORIS: Primary | ICD-10-CM

## 2023-08-07 LAB
ABO/RH: NORMAL
ANION GAP SERPL CALCULATED.3IONS-SCNC: 15 MMOL/L (ref 4–16)
ANTIBODY SCREEN: NEGATIVE
BUN SERPL-MCNC: 17 MG/DL (ref 6–23)
CALCIUM SERPL-MCNC: 9.3 MG/DL (ref 8.3–10.6)
CHLORIDE BLD-SCNC: 101 MMOL/L (ref 99–110)
CO2: 23 MMOL/L (ref 21–32)
CREAT SERPL-MCNC: 1 MG/DL (ref 0.9–1.3)
GFR SERPL CREATININE-BSD FRML MDRD: >60 ML/MIN/1.73M2
GLUCOSE SERPL-MCNC: 208 MG/DL (ref 70–99)
HCT VFR BLD CALC: 47 % (ref 42–52)
HEMOGLOBIN: 14.8 GM/DL (ref 13.5–18)
MCH RBC QN AUTO: 32.2 PG (ref 27–31)
MCHC RBC AUTO-ENTMCNC: 31.5 % (ref 32–36)
MCV RBC AUTO: 102.4 FL (ref 78–100)
PDW BLD-RTO: 13.6 % (ref 11.7–14.9)
PLATELET # BLD: 154 K/CU MM (ref 140–440)
PMV BLD AUTO: 10.7 FL (ref 7.5–11.1)
POTASSIUM SERPL-SCNC: 4.7 MMOL/L (ref 3.5–5.1)
RBC # BLD: 4.59 M/CU MM (ref 4.6–6.2)
SODIUM BLD-SCNC: 139 MMOL/L (ref 135–145)
WBC # BLD: 5.5 K/CU MM (ref 4–10.5)

## 2023-08-07 PROCEDURE — 86850 RBC ANTIBODY SCREEN: CPT

## 2023-08-07 PROCEDURE — 3074F SYST BP LT 130 MM HG: CPT | Performed by: INTERNAL MEDICINE

## 2023-08-07 PROCEDURE — 86900 BLOOD TYPING SEROLOGIC ABO: CPT

## 2023-08-07 PROCEDURE — 85027 COMPLETE CBC AUTOMATED: CPT

## 2023-08-07 PROCEDURE — 99214 OFFICE O/P EST MOD 30 MIN: CPT | Performed by: INTERNAL MEDICINE

## 2023-08-07 PROCEDURE — 80048 BASIC METABOLIC PNL TOTAL CA: CPT

## 2023-08-07 PROCEDURE — 3078F DIAST BP <80 MM HG: CPT | Performed by: INTERNAL MEDICINE

## 2023-08-07 PROCEDURE — 71046 X-RAY EXAM CHEST 2 VIEWS: CPT

## 2023-08-07 PROCEDURE — 36415 COLL VENOUS BLD VENIPUNCTURE: CPT

## 2023-08-07 PROCEDURE — 1123F ACP DISCUSS/DSCN MKR DOCD: CPT | Performed by: INTERNAL MEDICINE

## 2023-08-07 PROCEDURE — 86901 BLOOD TYPING SEROLOGIC RH(D): CPT

## 2023-08-07 RX ORDER — ASPIRIN 81 MG/1
81 TABLET ORAL DAILY
Qty: 90 TABLET | Refills: 1 | Status: SHIPPED | OUTPATIENT
Start: 2023-08-07

## 2023-08-07 NOTE — TELEPHONE ENCOUNTER
[address] [facility] [address] WITH POSSIBLE PERCUTANEOUS CORONARY INTERVENTION        Patient Name: Christiano Power    RVS:[id number]  MRN# [id number]    Date of Procedure: 8/10/23 Time: 6:30am Arrival Time: 074QB    The catheterization and angiogram are usually outpatient procedures, however if stenting is needed you may need to stay overnight. You will need to arrive at the hospital two hours before the procedure. You will go to registration in the main lobby. You will need to arrange for someone to drive you home. HOSPITAL:  [facility])      X   If you have received orders for blood work and or a chest x-ray, please have them done on assigned date at [facility], [facility], or [facility].     X Please do not have anything by mouth after midnight prior to or 8 hours before the procedure. X You may take your medications with a sip of water in the morning of your procedure or take them with you to the hospital          X If you take  Eliquis, you should hold it for 48 hours before your procedure. X If you are taking Metformin (Glucophage) or any medication containing Metformin (Glucophage) you must hold this medication the day before the procedure 8/9/23 , the day of your procedure and two days after your procedure. You may restart Metformin two days after your procedure on 8/13/23. X If you take Viagra (Sildenafil) or Cialis (Tadalafil) you will need to hold it for 3 days before your procedure.

## 2023-08-07 NOTE — PROGRESS NOTES
Christiano Rivas MD        OFFICE  FOLLOWUP NOTE    Chief complaints: patient is here for management of  CAD, DM, HTN, DYSLPIDEMIA, COVID 19, DVT    Subjective: patient feels better, no chest pain, no shortness of breath, no dizziness, no palpitations    HPI Taylor Farooq is a 69 y. o.year old who  has a past medical history of Abdominal ultrasound, BPH (benign prostatic hyperplasia), Diabetes mellitus ([address]), History of Doppler ultrasound, History of exercise stress test, Hx of Doppler echocardiogram, Hx of Doppler ultrasound, Hx of exercise stress test, Hypertension, Lower extremity doppler, and 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 and presents for management of  CAD, DM, HTN, DYSLPIDEMIA, COVID 19, DVT which are well controlled      Current Outpatient Medications   Medication Sig Dispense Refill    apixaban (ELIQUIS) 5 MG TABS tablet Take 1 tablet by mouth 2 times daily 180 tablet 3    metoprolol succinate (TOPROL XL) 25 MG extended release tablet Take 1 tablet by mouth daily 90 tablet 3    JARDIANCE 25 MG tablet 1 tablet daily      Dulaglutide (TRULICITY) 7.58 KP/0.8IB SOPN Inject 0.75 mg into the skin once a week      SYNTHROID 50 MCG tablet Take 1 tablet by mouth Daily      lisinopril (PRINIVIL;ZESTRIL) 5 MG tablet Take 1 tablet by mouth daily (Patient taking differently: Take 2 tablets by mouth daily) 90 tablet 3    atorvastatin (LIPITOR) 80 MG tablet Take 1 tablet by mouth daily 30 tablet 3    glimepiride (AMARYL) 2 MG tablet Take 1 tablet by mouth daily      METFORMIN HCL PO Take 1,000 mg by mouth 2 times daily. No current facility-administered medications for this visit. Allergies: Patient has no known allergies.   Past Medical History:   Diagnosis Date    Abdominal ultrasound 12/15/2021    Normal study    BPH (benign prostatic hyperplasia) 03/13/2021    Diabetes mellitus ([address])     History of Doppler ultrasound 07/26/2018    arterial duplex-no significant disease

## 2023-08-07 NOTE — PATIENT INSTRUCTIONS
**It is YOUR responsibilty to bring medication bottles and/or updated medication list to [address] [place] [address]. This will allow us to better serve you and all your healthcare needs**  [facility] Locations - No appointment necessary. Sites open Monday to Friday. Call your preferred location for test preparation, business   hours and other information you need. [employer] accepts [employer]. [address] [place] [address]. [address] [place], [address] [facility]  Phone: [phone]     Please be informed that if you contact our office outside of normal business hours the physician on call cannot help with any scheduling or rescheduling issues, procedure instruction questions or any type of medication issue. We advise you for any urgent/emergency that you go to the nearest emergency room! PLEASE CALL OUR OFFICE DURING NORMAL BUSINESS HOURS    Monday - Friday   8 am to 5 pm    [place]: [address] [place]: [phone]    [place]:  [phone]  . [place]EE[place]  We are committed to providing you the best care possible. If you receive a survey after visiting one of our offices, please take time to share your experience concerning your physician office visit. These surveys are confidential and no health information about you is shared. We are eager to improve for you and we are counting on your feedback to help make that happen.

## 2023-08-07 NOTE — TELEPHONE ENCOUNTER
Patient given instructions over telephone on [facility] for Dx: CAD. Procedure is scheduled for 8/10/23 @ 6:30am, w/arrival @ 545am, @ [facility]. Pre-admission orders are in [facility] for labwork and/or CXR, which are due 8/8/23 @ [facility]. Patient advised to review instructions given. Patient was notified that procedure date or time could be changed due to an emergency. Patient voiced understanding.

## 2023-08-09 ENCOUNTER — TELEPHONE (OUTPATIENT)
Dept: CARDIOLOGY CLINIC | Age: 78
End: 2023-08-09

## 2023-08-09 NOTE — TELEPHONE ENCOUNTER
Dr. aHns Pierre notified of abn CXR, he said that he wants pt to come tomorrow for his procedure and that he will talk to pt. Dr. Hans Pierre will be calling his PCP, Dr. Elisabet Villagomez also.

## 2023-08-10 ENCOUNTER — HOSPITAL ENCOUNTER (OUTPATIENT)
Dept: CARDIAC CATH/INVASIVE PROCEDURES | Age: 78
Discharge: HOME | End: 2023-08-10
Attending: INTERNAL MEDICINE | Admitting: INTERNAL MEDICINE
Payer: MEDICARE

## 2023-08-10 VITALS
DIASTOLIC BLOOD PRESSURE: 76 MMHG | HEART RATE: 75 BPM | OXYGEN SATURATION: 95 % | HEIGHT: 68 IN | SYSTOLIC BLOOD PRESSURE: 108 MMHG | RESPIRATION RATE: 16 BRPM | WEIGHT: 191 LBS | TEMPERATURE: 97.6 F | BODY MASS INDEX: 28.95 KG/M2

## 2023-08-10 PROBLEM — R94.39 ABNORMAL CARDIOVASCULAR STRESS TEST: Status: ACTIVE | Noted: 2023-08-10

## 2023-08-10 PROCEDURE — 2580000003 HC RX 258

## 2023-08-10 PROCEDURE — 6360000002 HC RX W HCPCS

## 2023-08-10 PROCEDURE — 93458 L HRT ARTERY/VENTRICLE ANGIO: CPT | Performed by: INTERNAL MEDICINE

## 2023-08-10 PROCEDURE — C1894 INTRO/SHEATH, NON-LASER: HCPCS

## 2023-08-10 PROCEDURE — 2709999900 HC NON-CHARGEABLE SUPPLY

## 2023-08-10 PROCEDURE — C1769 GUIDE WIRE: HCPCS

## 2023-08-10 PROCEDURE — 6360000004 HC RX CONTRAST MEDICATION

## 2023-08-10 PROCEDURE — 93458 L HRT ARTERY/VENTRICLE ANGIO: CPT

## 2023-08-10 PROCEDURE — 2500000003 HC RX 250 WO HCPCS

## 2023-08-10 RX ORDER — SODIUM CHLORIDE 9 MG/ML
INJECTION, SOLUTION INTRAVENOUS CONTINUOUS
Status: DISCONTINUED | OUTPATIENT
Start: 2023-08-10 | End: 2023-08-10 | Stop reason: HOSPADM

## 2023-08-10 RX ORDER — DIPHENHYDRAMINE HCL 25 MG
25 TABLET ORAL ONCE
Status: DISCONTINUED | OUTPATIENT
Start: 2023-08-10 | End: 2023-08-10 | Stop reason: HOSPADM

## 2023-08-10 RX ORDER — DIAZEPAM 5 MG/1
5 TABLET ORAL ONCE
Status: DISCONTINUED | OUTPATIENT
Start: 2023-08-10 | End: 2023-08-10 | Stop reason: HOSPADM

## 2023-08-10 NOTE — H&P
Chief complaints: patient is here for management of  CAD, DM, HTN, DYSLPIDEMIA, COVID 19, DVT     Subjective: patient feels better, no chest pain, no shortness of breath, no dizziness, no palpitations     HPI Jasper Sanchez is a 69 y. o.year old who  has a past medical history of Abdominal ultrasound, BPH (benign prostatic hyperplasia), Diabetes mellitus ([address]), History of Doppler ultrasound, History of exercise stress test, Hx of Doppler echocardiogram, Hx of Doppler ultrasound, Hx of exercise stress test, Hypertension, Lower extremity doppler, and 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 and presents for management of  CAD, DM, HTN, DYSLPIDEMIA, COVID 19, DVT which are well controlled        Current Facility-Administered Medications          Current Outpatient Medications   Medication Sig Dispense Refill    apixaban (ELIQUIS) 5 MG TABS tablet Take 1 tablet by mouth 2 times daily 180 tablet 3    metoprolol succinate (TOPROL XL) 25 MG extended release tablet Take 1 tablet by mouth daily 90 tablet 3    JARDIANCE 25 MG tablet 1 tablet daily        Dulaglutide (TRULICITY) 5.58 FW/8.4BQ SOPN Inject 0.75 mg into the skin once a week        SYNTHROID 50 MCG tablet Take 1 tablet by mouth Daily        lisinopril (PRINIVIL;ZESTRIL) 5 MG tablet Take 1 tablet by mouth daily (Patient taking differently: Take 2 tablets by mouth daily) 90 tablet 3    atorvastatin (LIPITOR) 80 MG tablet Take 1 tablet by mouth daily 30 tablet 3    glimepiride (AMARYL) 2 MG tablet Take 1 tablet by mouth daily        METFORMIN HCL PO Take 1,000 mg by mouth 2 times daily. No current facility-administered medications for this visit. Allergies: Patient has no known allergies.   Past Medical History        Past Medical History:   Diagnosis Date    Abdominal ultrasound 12/15/2021     Normal study    BPH (benign prostatic hyperplasia) 03/13/2021    Diabetes mellitus ([address])      History of Doppler ultrasound 07/26/2018     arterial

## 2023-08-10 NOTE — PROGRESS NOTES
To car per wheelchair and home with wife. Procedure site remains free from active bleeding or hematoma. Arm board in place.

## 2023-08-28 ENCOUNTER — HOSPITAL ENCOUNTER (OUTPATIENT)
Dept: PET IMAGING | Age: 78
Discharge: HOME | End: 2023-08-28
Attending: INTERNAL MEDICINE
Payer: MEDICARE

## 2023-08-28 DIAGNOSIS — R91.1 LUNG NODULE: ICD-10-CM

## 2023-08-28 PROCEDURE — A9552 F18 FDG: HCPCS | Performed by: INTERNAL MEDICINE

## 2023-08-28 PROCEDURE — 78815 PET IMAGE W/CT SKULL-THIGH: CPT

## 2023-08-28 PROCEDURE — 2580000003 HC RX 258: Performed by: INTERNAL MEDICINE

## 2023-08-28 PROCEDURE — 3430000000 HC RX DIAGNOSTIC RADIOPHARMACEUTICAL: Performed by: INTERNAL MEDICINE

## 2023-08-28 RX ORDER — FLUDEOXYGLUCOSE F 18 200 MCI/ML
16.06 INJECTION, SOLUTION INTRAVENOUS
Status: COMPLETED | OUTPATIENT
Start: 2023-08-28 | End: 2023-08-28

## 2023-08-28 RX ORDER — SODIUM CHLORIDE 0.9 % (FLUSH) 0.9 %
10 SYRINGE (ML) INJECTION PRN
Status: COMPLETED | OUTPATIENT
Start: 2023-08-28 | End: 2023-08-28

## 2023-08-28 RX ADMIN — FLUDEOXYGLUCOSE F 18 16.06 MILLICURIE: 200 INJECTION, SOLUTION INTRAVENOUS at 08:07

## 2023-08-28 RX ADMIN — SODIUM CHLORIDE, PRESERVATIVE FREE 10 ML: 5 INJECTION INTRAVENOUS at 08:07

## 2023-09-05 ENCOUNTER — OFFICE VISIT (OUTPATIENT)
Dept: CARDIOLOGY CLINIC | Age: 78
End: 2023-09-05
Payer: MEDICARE

## 2023-09-05 VITALS
OXYGEN SATURATION: 92 % | DIASTOLIC BLOOD PRESSURE: 54 MMHG | SYSTOLIC BLOOD PRESSURE: 100 MMHG | WEIGHT: 195 LBS | HEIGHT: 68 IN | HEART RATE: 76 BPM | BODY MASS INDEX: 29.55 KG/M2

## 2023-09-05 DIAGNOSIS — I25.10 CORONARY ARTERY DISEASE INVOLVING NATIVE CORONARY ARTERY OF NATIVE HEART WITHOUT ANGINA PECTORIS: Primary | ICD-10-CM

## 2023-09-05 DIAGNOSIS — R06.02 SOB (SHORTNESS OF BREATH): ICD-10-CM

## 2023-09-05 DIAGNOSIS — R94.31 ABNORMAL EKG: ICD-10-CM

## 2023-09-05 DIAGNOSIS — I82.402 DEEP VEIN THROMBOSIS (DVT) OF LEFT LOWER EXTREMITY, UNSPECIFIED CHRONICITY, UNSPECIFIED VEIN (HCC): ICD-10-CM

## 2023-09-05 DIAGNOSIS — I10 PRIMARY HYPERTENSION: ICD-10-CM

## 2023-09-05 PROCEDURE — 3078F DIAST BP <80 MM HG: CPT | Performed by: INTERNAL MEDICINE

## 2023-09-05 PROCEDURE — 3074F SYST BP LT 130 MM HG: CPT | Performed by: INTERNAL MEDICINE

## 2023-09-05 PROCEDURE — 1123F ACP DISCUSS/DSCN MKR DOCD: CPT | Performed by: INTERNAL MEDICINE

## 2023-09-05 PROCEDURE — 99214 OFFICE O/P EST MOD 30 MIN: CPT | Performed by: INTERNAL MEDICINE

## 2023-09-05 NOTE — PROGRESS NOTES
Pamela Zapata MD        OFFICE  FOLLOWUP NOTE    Chief complaints: patient is here for management of  CAD, DM, HTN, DYSLPIDEMIA, COVID 19, DVT, lung nodules    Subjective: patient feels better, no chest pain, no shortness of breath, no dizziness, no palpitations    HPI Kimberly Pham is a 69 y. o.year old who  has a past medical history of Abdominal ultrasound, BPH (benign prostatic hyperplasia), Diabetes mellitus ([address]), History of Doppler ultrasound, History of exercise stress test, Hx of Doppler echocardiogram, Hx of Doppler ultrasound, Hx of exercise stress test, Hypertension, Lower extremity doppler, and 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 and presents for management of CAD, DM, HTN, DYSLPIDEMIA, COVID 19, DVT which are well controlled    LHC showed patent LAD stent, CXR showed nodules and PETS scan is normal.  Current Outpatient Medications   Medication Sig Dispense Refill    aspirin 81 MG EC tablet Take 1 tablet by mouth daily 90 tablet 1    apixaban (ELIQUIS) 5 MG TABS tablet Take 1 tablet by mouth 2 times daily 180 tablet 3    metoprolol succinate (TOPROL XL) 25 MG extended release tablet Take 1 tablet by mouth daily 90 tablet 3    JARDIANCE 25 MG tablet 1 tablet daily      Dulaglutide (TRULICITY) 9.80 YJ/4.6OV SOPN Inject 0.75 mg into the skin once a week      SYNTHROID 50 MCG tablet Take 1 tablet by mouth Daily      lisinopril (PRINIVIL;ZESTRIL) 5 MG tablet Take 1 tablet by mouth daily (Patient taking differently: Take 2 tablets by mouth daily) 90 tablet 3    atorvastatin (LIPITOR) 80 MG tablet Take 1 tablet by mouth daily 30 tablet 3    glimepiride (AMARYL) 2 MG tablet Take 1 tablet by mouth daily      METFORMIN HCL PO Take 1,000 mg by mouth 2 times daily. No current facility-administered medications for this visit. Allergies: Patient has no known allergies.   Past Medical History:   Diagnosis Date    Abdominal ultrasound 12/15/2021    Normal study    BPH (benign

## 2023-11-14 ENCOUNTER — APPOINTMENT (OUTPATIENT)
Dept: NON INVASIVE DIAGNOSTICS | Age: 78
End: 2023-11-14
Payer: MEDICARE

## 2023-11-14 ENCOUNTER — APPOINTMENT (OUTPATIENT)
Dept: CT IMAGING | Age: 78
End: 2023-11-14
Payer: MEDICARE

## 2023-11-14 ENCOUNTER — APPOINTMENT (OUTPATIENT)
Dept: MRI IMAGING | Age: 78
End: 2023-11-14
Payer: MEDICARE

## 2023-11-14 ENCOUNTER — APPOINTMENT (OUTPATIENT)
Dept: GENERAL RADIOLOGY | Age: 78
End: 2023-11-14
Payer: MEDICARE

## 2023-11-14 ENCOUNTER — HOSPITAL ENCOUNTER (OUTPATIENT)
Age: 78
Setting detail: OBSERVATION
Discharge: HOME | End: 2023-11-15
Attending: EMERGENCY MEDICINE | Admitting: STUDENT IN AN ORGANIZED HEALTH CARE EDUCATION/TRAINING PROGRAM
Payer: MEDICARE

## 2023-11-14 DIAGNOSIS — R42 DIZZINESS: Primary | ICD-10-CM

## 2023-11-14 LAB
ALBUMIN SERPL-MCNC: 4 GM/DL (ref 3.4–5)
ALP BLD-CCNC: 147 IU/L (ref 40–129)
ALT SERPL-CCNC: 66 U/L (ref 10–40)
ANION GAP SERPL CALCULATED.3IONS-SCNC: 14 MMOL/L (ref 4–16)
AST SERPL-CCNC: 48 IU/L (ref 15–37)
BASOPHILS ABSOLUTE: 0.1 K/CU MM
BASOPHILS RELATIVE PERCENT: 1.8 % (ref 0–1)
BILIRUB SERPL-MCNC: 0.6 MG/DL (ref 0–1)
BUN SERPL-MCNC: 15 MG/DL (ref 6–23)
CALCIUM SERPL-MCNC: 9.3 MG/DL (ref 8.3–10.6)
CHLORIDE BLD-SCNC: 104 MMOL/L (ref 99–110)
CHP ED QC CHECK: NORMAL
CO2: 23 MMOL/L (ref 21–32)
CREAT SERPL-MCNC: 0.9 MG/DL (ref 0.9–1.3)
DIFFERENTIAL TYPE: ABNORMAL
ECHO AO ROOT DIAM: 3.4 CM
ECHO AO ROOT INDEX: 1.72 CM/M2
ECHO AV AREA PEAK VELOCITY: 2.8 CM2
ECHO AV AREA VTI: 3.1 CM2
ECHO AV AREA/BSA PEAK VELOCITY: 1.4 CM2/M2
ECHO AV AREA/BSA VTI: 1.6 CM2/M2
ECHO AV MEAN GRADIENT: 2 MMHG
ECHO AV MEAN VELOCITY: 0.7 M/S
ECHO AV PEAK GRADIENT: 5 MMHG
ECHO AV PEAK VELOCITY: 1.1 M/S
ECHO AV VELOCITY RATIO: 0.91
ECHO AV VTI: 17.1 CM
ECHO BSA: 2.01 M2
ECHO IVC PROX: 1.5 CM
ECHO LA DIAMETER INDEX: 1.46 CM/M2
ECHO LA DIAMETER: 2.9 CM
ECHO LA TO AORTIC ROOT RATIO: 0.85
ECHO LV E' LATERAL VELOCITY: 10 CM/S
ECHO LV E' SEPTAL VELOCITY: 8 CM/S
ECHO LV EDV A4C: 68 ML
ECHO LV EDV INDEX A4C: 34 ML/M2
ECHO LV EJECTION FRACTION A4C: 76 %
ECHO LV ESV A4C: 16 ML
ECHO LV ESV INDEX A4C: 8 ML/M2
ECHO LV FRACTIONAL SHORTENING: 47 % (ref 28–44)
ECHO LV INTERNAL DIMENSION DIASTOLE INDEX: 2.47 CM/M2
ECHO LV INTERNAL DIMENSION DIASTOLIC: 4.9 CM (ref 4.2–5.9)
ECHO LV INTERNAL DIMENSION SYSTOLIC INDEX: 1.31 CM/M2
ECHO LV INTERNAL DIMENSION SYSTOLIC: 2.6 CM
ECHO LV IVSD: 1 CM (ref 0.6–1)
ECHO LV MASS 2D: 153 G (ref 88–224)
ECHO LV MASS INDEX 2D: 77.2 G/M2 (ref 49–115)
ECHO LV POSTERIOR WALL DIASTOLIC: 0.8 CM (ref 0.6–1)
ECHO LV RELATIVE WALL THICKNESS RATIO: 0.33
ECHO LVOT AREA: 3.1 CM2
ECHO LVOT AV VTI INDEX: 0.99
ECHO LVOT DIAM: 2 CM
ECHO LVOT MEAN GRADIENT: 2 MMHG
ECHO LVOT PEAK GRADIENT: 4 MMHG
ECHO LVOT PEAK VELOCITY: 1 M/S
ECHO LVOT STROKE VOLUME INDEX: 27 ML/M2
ECHO LVOT SV: 53.4 ML
ECHO LVOT VTI: 17 CM
ECHO RV MID DIMENSION: 2.9 CM
EKG ATRIAL RATE: 72 BPM
EKG DIAGNOSIS: NORMAL
EKG P-R INTERVAL: 160 MS
EKG Q-T INTERVAL: 410 MS
EKG QRS DURATION: 84 MS
EKG QTC CALCULATION (BAZETT): 445 MS
EKG R AXIS: 30 DEGREES
EKG T AXIS: 63 DEGREES
EKG VENTRICULAR RATE: 71 BPM
EOSINOPHILS ABSOLUTE: 0.6 K/CU MM
EOSINOPHILS RELATIVE PERCENT: 14.2 % (ref 0–3)
ESTIMATED AVERAGE GLUCOSE: 166 MG/DL
GFR SERPL CREATININE-BSD FRML MDRD: >60 ML/MIN/1.73M2
GLUCOSE BLD-MCNC: 112 MG/DL (ref 70–99)
GLUCOSE BLD-MCNC: 177 MG/DL
GLUCOSE BLD-MCNC: 177 MG/DL (ref 70–99)
GLUCOSE SERPL-MCNC: 183 MG/DL (ref 70–99)
HBA1C MFR BLD: 7.4 % (ref 4.2–6.3)
HCT VFR BLD CALC: 45.1 % (ref 42–52)
HEMOGLOBIN: 14.9 GM/DL (ref 13.5–18)
IMMATURE NEUTROPHIL %: 0.3 % (ref 0–0.43)
INR BLD: 1.3 INDEX
LYMPHOCYTES ABSOLUTE: 0.7 K/CU MM
LYMPHOCYTES RELATIVE PERCENT: 17.3 % (ref 24–44)
MAGNESIUM: 2.3 MG/DL (ref 1.8–2.4)
MCH RBC QN AUTO: 33.2 PG (ref 27–31)
MCHC RBC AUTO-ENTMCNC: 33 % (ref 32–36)
MCV RBC AUTO: 100.4 FL (ref 78–100)
MONOCYTES ABSOLUTE: 0.3 K/CU MM
MONOCYTES RELATIVE PERCENT: 8.8 % (ref 0–4)
NUCLEATED RBC %: 0 %
PDW BLD-RTO: 13.9 % (ref 11.7–14.9)
PLATELET # BLD: 119 K/CU MM (ref 140–440)
PMV BLD AUTO: 10.3 FL (ref 7.5–11.1)
POTASSIUM SERPL-SCNC: 4.3 MMOL/L (ref 3.5–5.1)
PROTHROMBIN TIME: 16.2 SECONDS (ref 11.7–14.5)
RBC # BLD: 4.49 M/CU MM (ref 4.6–6.2)
SARS-COV-2 RDRP RESP QL NAA+PROBE: NOT DETECTED
SEGMENTED NEUTROPHILS ABSOLUTE COUNT: 2.2 K/CU MM
SEGMENTED NEUTROPHILS RELATIVE PERCENT: 57.6 % (ref 36–66)
SODIUM BLD-SCNC: 141 MMOL/L (ref 135–145)
SOURCE: NORMAL
TOTAL IMMATURE NEUTOROPHIL: 0.01 K/CU MM
TOTAL NUCLEATED RBC: 0 K/CU MM
TOTAL PROTEIN: 7.2 GM/DL (ref 6.4–8.2)
TROPONIN, HIGH SENSITIVITY: 10 NG/L (ref 0–22)
TSH SERPL DL<=0.005 MIU/L-ACNC: 4.1 UIU/ML (ref 0.27–4.2)
WBC # BLD: 3.9 K/CU MM (ref 4–10.5)

## 2023-11-14 PROCEDURE — 85610 PROTHROMBIN TIME: CPT

## 2023-11-14 PROCEDURE — 6360000004 HC RX CONTRAST MEDICATION

## 2023-11-14 PROCEDURE — G0378 HOSPITAL OBSERVATION PER HR: HCPCS

## 2023-11-14 PROCEDURE — 93005 ELECTROCARDIOGRAM TRACING: CPT | Performed by: EMERGENCY MEDICINE

## 2023-11-14 PROCEDURE — 93010 ELECTROCARDIOGRAM REPORT: CPT | Performed by: INTERNAL MEDICINE

## 2023-11-14 PROCEDURE — 83036 HEMOGLOBIN GLYCOSYLATED A1C: CPT

## 2023-11-14 PROCEDURE — 84443 ASSAY THYROID STIM HORMONE: CPT

## 2023-11-14 PROCEDURE — 70498 CT ANGIOGRAPHY NECK: CPT

## 2023-11-14 PROCEDURE — 85025 COMPLETE CBC W/AUTO DIFF WBC: CPT

## 2023-11-14 PROCEDURE — 70450 CT HEAD/BRAIN W/O DYE: CPT

## 2023-11-14 PROCEDURE — 80053 COMPREHEN METABOLIC PANEL: CPT

## 2023-11-14 PROCEDURE — 84484 ASSAY OF TROPONIN QUANT: CPT

## 2023-11-14 PROCEDURE — 2580000003 HC RX 258: Performed by: PHYSICIAN ASSISTANT

## 2023-11-14 PROCEDURE — 6360000004 HC RX CONTRAST MEDICATION: Performed by: EMERGENCY MEDICINE

## 2023-11-14 PROCEDURE — 96360 HYDRATION IV INFUSION INIT: CPT

## 2023-11-14 PROCEDURE — 6370000000 HC RX 637 (ALT 250 FOR IP): Performed by: PHYSICIAN ASSISTANT

## 2023-11-14 PROCEDURE — A9579 GAD-BASE MR CONTRAST NOS,1ML: HCPCS | Performed by: PHYSICIAN ASSISTANT

## 2023-11-14 PROCEDURE — C8929 TTE W OR WO FOL WCON,DOPPLER: HCPCS

## 2023-11-14 PROCEDURE — 83735 ASSAY OF MAGNESIUM: CPT

## 2023-11-14 PROCEDURE — 99285 EMERGENCY DEPT VISIT HI MDM: CPT

## 2023-11-14 PROCEDURE — 94761 N-INVAS EAR/PLS OXIMETRY MLT: CPT

## 2023-11-14 PROCEDURE — 82962 GLUCOSE BLOOD TEST: CPT

## 2023-11-14 PROCEDURE — 70553 MRI BRAIN STEM W/O & W/DYE: CPT

## 2023-11-14 PROCEDURE — 96361 HYDRATE IV INFUSION ADD-ON: CPT

## 2023-11-14 PROCEDURE — 92610 EVALUATE SWALLOWING FUNCTION: CPT

## 2023-11-14 PROCEDURE — 6360000004 HC RX CONTRAST MEDICATION: Performed by: PHYSICIAN ASSISTANT

## 2023-11-14 PROCEDURE — 87635 SARS-COV-2 COVID-19 AMP PRB: CPT

## 2023-11-14 PROCEDURE — 71045 X-RAY EXAM CHEST 1 VIEW: CPT

## 2023-11-14 RX ORDER — SODIUM CHLORIDE 9 MG/ML
INJECTION, SOLUTION INTRAVENOUS PRN
Status: DISCONTINUED | OUTPATIENT
Start: 2023-11-14 | End: 2023-11-15 | Stop reason: HOSPADM

## 2023-11-14 RX ORDER — INSULIN LISPRO 100 [IU]/ML
0-4 INJECTION, SOLUTION INTRAVENOUS; SUBCUTANEOUS NIGHTLY
Status: DISCONTINUED | OUTPATIENT
Start: 2023-11-14 | End: 2023-11-15 | Stop reason: HOSPADM

## 2023-11-14 RX ORDER — SODIUM CHLORIDE 0.9 % (FLUSH) 0.9 %
5-40 SYRINGE (ML) INJECTION EVERY 12 HOURS SCHEDULED
Status: DISCONTINUED | OUTPATIENT
Start: 2023-11-14 | End: 2023-11-15 | Stop reason: HOSPADM

## 2023-11-14 RX ORDER — GLUCAGON 1 MG/ML
1 KIT INJECTION PRN
Status: DISCONTINUED | OUTPATIENT
Start: 2023-11-14 | End: 2023-11-15 | Stop reason: HOSPADM

## 2023-11-14 RX ORDER — ASPIRIN 81 MG/1
81 TABLET, CHEWABLE ORAL NIGHTLY
Status: DISCONTINUED | OUTPATIENT
Start: 2023-11-14 | End: 2023-11-15 | Stop reason: HOSPADM

## 2023-11-14 RX ORDER — ONDANSETRON 2 MG/ML
4 INJECTION INTRAMUSCULAR; INTRAVENOUS EVERY 30 MIN PRN
Status: DISCONTINUED | OUTPATIENT
Start: 2023-11-14 | End: 2023-11-14 | Stop reason: HOSPADM

## 2023-11-14 RX ORDER — METOPROLOL SUCCINATE 25 MG/1
25 TABLET, EXTENDED RELEASE ORAL DAILY
Status: DISCONTINUED | OUTPATIENT
Start: 2023-11-15 | End: 2023-11-15 | Stop reason: HOSPADM

## 2023-11-14 RX ORDER — LISINOPRIL 10 MG/1
10 TABLET ORAL DAILY
COMMUNITY
Start: 2023-09-07

## 2023-11-14 RX ORDER — ONDANSETRON 2 MG/ML
4 INJECTION INTRAMUSCULAR; INTRAVENOUS EVERY 6 HOURS PRN
Status: DISCONTINUED | OUTPATIENT
Start: 2023-11-14 | End: 2023-11-15 | Stop reason: HOSPADM

## 2023-11-14 RX ORDER — SODIUM CHLORIDE 0.9 % (FLUSH) 0.9 %
5-40 SYRINGE (ML) INJECTION PRN
Status: DISCONTINUED | OUTPATIENT
Start: 2023-11-14 | End: 2023-11-15 | Stop reason: HOSPADM

## 2023-11-14 RX ORDER — DEXTROSE MONOHYDRATE 100 MG/ML
INJECTION, SOLUTION INTRAVENOUS CONTINUOUS PRN
Status: DISCONTINUED | OUTPATIENT
Start: 2023-11-14 | End: 2023-11-15 | Stop reason: HOSPADM

## 2023-11-14 RX ORDER — SODIUM CHLORIDE, SODIUM LACTATE, POTASSIUM CHLORIDE, CALCIUM CHLORIDE 600; 310; 30; 20 MG/100ML; MG/100ML; MG/100ML; MG/100ML
INJECTION, SOLUTION INTRAVENOUS CONTINUOUS
Status: DISPENSED | OUTPATIENT
Start: 2023-11-14 | End: 2023-11-15

## 2023-11-14 RX ORDER — ONDANSETRON 4 MG/1
4 TABLET, ORALLY DISINTEGRATING ORAL EVERY 8 HOURS PRN
Status: DISCONTINUED | OUTPATIENT
Start: 2023-11-14 | End: 2023-11-15 | Stop reason: HOSPADM

## 2023-11-14 RX ORDER — LEVOTHYROXINE SODIUM 0.05 MG/1
50 TABLET ORAL DAILY
Status: DISCONTINUED | OUTPATIENT
Start: 2023-11-15 | End: 2023-11-15 | Stop reason: HOSPADM

## 2023-11-14 RX ORDER — INSULIN LISPRO 100 [IU]/ML
0-4 INJECTION, SOLUTION INTRAVENOUS; SUBCUTANEOUS
Status: DISCONTINUED | OUTPATIENT
Start: 2023-11-14 | End: 2023-11-15 | Stop reason: HOSPADM

## 2023-11-14 RX ORDER — POLYETHYLENE GLYCOL 3350 17 G/17G
17 POWDER, FOR SOLUTION ORAL DAILY PRN
Status: DISCONTINUED | OUTPATIENT
Start: 2023-11-14 | End: 2023-11-15 | Stop reason: HOSPADM

## 2023-11-14 RX ADMIN — IOPAMIDOL 80 ML: 755 INJECTION, SOLUTION INTRAVENOUS at 10:21

## 2023-11-14 RX ADMIN — SODIUM CHLORIDE, POTASSIUM CHLORIDE, SODIUM LACTATE AND CALCIUM CHLORIDE: 600; 310; 30; 20 INJECTION, SOLUTION INTRAVENOUS at 15:06

## 2023-11-14 RX ADMIN — GADOTERIDOL 18 ML: 279.3 INJECTION, SOLUTION INTRAVENOUS at 18:02

## 2023-11-14 RX ADMIN — PERFLUTREN 2.2 ML: 6.52 INJECTION, SUSPENSION INTRAVENOUS at 15:23

## 2023-11-14 RX ADMIN — ASPIRIN 81 MG: 81 TABLET, CHEWABLE ORAL at 23:45

## 2023-11-14 ASSESSMENT — PAIN - FUNCTIONAL ASSESSMENT: PAIN_FUNCTIONAL_ASSESSMENT: NONE - DENIES PAIN

## 2023-11-14 NOTE — PROGRESS NOTES
4 Eyes Skin Assessment     NAME:  Saw Lang  YOB: 1945  MEDICAL RECORD NUMBER:  [id number]    The patient is being assessed for  Admission    I agree that at least one RN has performed a thorough Head to Toe Skin Assessment on the patient. ALL assessment sites listed below have been assessed. Areas assessed by both nurses:    Head, Face, Ears, Shoulders, Back, Chest, Arms, Elbows, Hands, Sacrum. Buttock, Coccyx, Ischium, Legs. Feet and Heels, and Under Medical Devices         Does the Patient have a Wound?  No noted wound(s)       [place] Prevention initiated by RN: No  Wound Care Orders initiated by RN: No    Pressure Injury (Stage 3,4, Unstageable, DTI, NWPT, and Complex wounds) if present, place Wound referral order by RN under : No    New Ostomies, if present place, Ostomy referral order under : No     Nurse 1 eSignature: Electronically signed by Autumn Schaefer RN on 11/14/23 at 3:25 PM EST    **SHARE this note so that the co-signing nurse can place an eSignature**    Nurse 2 eSignature: Electronically signed by Aura Soares RN on 11/14/23 at 6:09 PM EST

## 2023-11-14 NOTE — ED NOTES
Medication History  [facility]    Patient Name: Jas Vasques 1945     Medication history has been completed by: Dio Cain CPhT    Source(s) of information: patient and insurance claims     Primary Care Physician: Juan Luis Jimenez MD     Pharm[facility]    Allergies as of 11/14/2023    (No Known Allergies)        Prior to Admission medications    Medication Sig Start Date End Date Taking? Authorizing Provider   lisinopril (PRINIVIL;ZESTRIL) 10 MG tablet Take 1 tablet by mouth daily 9/7/23   Andreas Calixto MD   apixaban (ELIQUIS) 5 MG TABS tablet Take 1 tablet by mouth 2 times daily 10/16/23   JELANI Ramon CNP   aspirin 81 MG EC tablet  Take 1 tablet by mouth nightly 8/7/23   Kaye Landers MD   metoprolol succinate (TOPROL XL) 25 MG extended release tablet Take 1 tablet by mouth daily 6/20/22   JELANI Ramon CNP   JARDIANCE 25 MG tablet Take 1 tablet by mouth daily 4/12/22   Andreas Calixto MD   Dulaglutide 3 MG/0.5ML SOPN Inject 3 mg into the skin once a week Takes on Sunday    Andreas Calixto MD   SYNTHROID 50 MCG tablet Take 1 tablet by mouth Daily 5/27/20   Andreas Calixto MD   atorvastatin (LIPITOR) 80 MG tablet Take 1 tablet by mouth daily 11/12/17   Chanda Mattson MD   glimepiride (AMARYL) 2 MG tablet Take 1 tablet by mouth daily    Andreas Calixto MD   metFORMIN (GLUCOPHAGE) 500 MG tablet Take 2 tablets by mouth 2 times daily    Andreas Calixto MD     Medications added or changed (ex. new medication, dosage change, interval change, formulation change):  Lisinopril dosage clarified    Comments:  Medication list reviewed with patient and insurance claims verified. Eliquis therapy updated first dose of medications taken today.     To my knowledge the above medication history is accurate as of 11/14/2023 10:59 AM.   Dio Cain CPhT   11/14/2023 10:59 AM
[facility] called      Nakul New, MICK  11/14/23 7967
Patient was at work and felt dizzy and lightheaded around [address] [place] [address]  11/14/23 3324
Pt to [address] [place] [address], Benton Alejandre RN  11/14/23 6205
drift  Motor Arm, Right (5b): No drift  Motor Leg, Left (6a): No drift  Motor Leg, Right (6b):  No drift  Limb Ataxia (7): Absent  Sensory (8): Normal  Best Language (9): No aphasia  Dysarthria (10): Normal  Extinction and Inattention (11): No abnormality  Total: 0   Active LDA's:      Pertinent or High Risk Medications/Drips: no   If Yes, please provide details:   Blood Product Administration: no  If Yes, please provide details:     Recommendation    Incomplete orders inpatient orders  Additional Comments:    If any further questions, please call Sending RN at [phone]    Electronically signed by: Electronically signed by Pina Odom RN on 11/14/2023 at 10:51 AM       Pina Odom, [address] [place] [address]  11/14/23 4785

## 2023-11-14 NOTE — PROGRESS NOTES
Facility/Department: [facility] OBSERVATION   CLINICAL BEDSIDE SWALLOW EVALUATION    NAME: Izzy Lang  : 1945  MRN: [id number]    IMPRESSIONS AND RECOMMENDATIONS: Sandy Polanco was referred for a bedside swallow evaluation following admission to [facility] with dizziness, concern for possible CVA. Per radiologist, head CT negative for acute abnormality. MRI pending. Medical hx includes DM, CAD, HTN, hypothyroidism. No known history of dysphagia prior to admission. Pt seen for evaluation seated upright in bed, alert, pleasant, cooperative. Oral mechanism examination WFL without asymmetry. Pt presented with PO trials of thin liquids via cup/straw and regular solids. Oropharyngeal swallow WFL with intact labial seal, mastication, oral clearance, and no s/s aspiration. Speech/language/cognition screened and judged intact. Recommend initiation of regular diet/thin liquids. No further acute SLP needs identified. ADMISSION DATE: 2023  ADMITTING DIAGNOSIS: has Chest pain; Type 2 diabetes mellitus ([address]); Abnormal LFTs; Hypertension; Erythrocytosis; CAD (coronary artery disease); Diarrhea; VIOLA (acute kidney injury) ([address]); Dyslipidemia; H/O deep venous thrombosis; Abnormal cardiovascular stress test; and Dizziness on their problem list.  ONSET DATE: this admission    Recent Chest Xray/CT of Chest: see chart    Date of Eval: 2023  Evaluating Therapist: LUIS Schmidt    Current Diet level:  Current Diet : NPO  Current Liquid Diet : NPO    Primary Complaint  denies speech/swallowing difficulty    Pain:  Pain Assessment  Pain Assessment: None - Denies Pain    Reason for Referral  Sandy Polanco was referred for a bedside swallow evaluation to assess the efficiency of his swallow function, identify signs and symptoms of aspiration and make recommendations regarding safe dietary consistencies, effective compensatory strategies, and safe eating environment.     Impression  Dysphagia Diagnosis:

## 2023-11-14 NOTE — H&P
V2.0  History and Physical      Name:  Bertin Okeefe /Age/Sex: 1945  (69 y.o. male)   MRN & CSN:  [id number] & [id number] Encounter Date/Time: 2023 11:53 AM EST   Location:  ED19/ED-19 PCP: Leena Quintanilla MD       Hospital Day: 1    Assessment and Plan:   Bertin Okeefe is a 69 y.o. male with a past medical history of T2DM, CAD, DVT, hypertension who presents with Dizziness.     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  - admit NIHSS 0  - continue neuro checks  - NPO until bedside swallow eval   - SLP, defer PT/OT as patient ambulating without difficulty   - monitor on telemetry   - continue ASA, eliquis  - permissive hypertension <220/120  - MRI brain, echo, FLP, HgbA1c, orthostats ordered   - neurology consulted, appreciate recs  - given frequent PACs, will also consult cardiology     Frequent PACs  - noted on EKG and telemetry   - check TSH, mag   - continue home BB  - given lightheadedness, consult cardio   - monitor on tele     Elevated LFTs  -Alk phos 147, ALT 66, AST 48   -Denies RUQ/epigastric abdominal pain  - Appears LFTs chronically elevated, reports history of fatty liver  - hold statin, resume if LFTs stable in AM    Brain mass  - CT head with 1.4cm hypoattenuating mass in the frontal horn of the left lateral ventricle, stable since   -MRI brain with contrast pending    Thrombocytopenia  -Platelets 401  - recheck CBC in AM     T2DM  -Hold home p.o. meds  -Start low-dose SSI  -Check HgbA1c  - hypoglycemia management protocol    CAD  - s/p recent LHC with normal LAD stent   -Continue home aspirin, beta-blocker, resume statin if LFTs

## 2023-11-14 NOTE — CARE COORDINATION
MCG criteria for Dizziness reviewed at this time, criteria supports Observation Admission.  BAM,RN/CM

## 2023-11-14 NOTE — ED PROVIDER NOTES
Emergency Department Encounter    Patient: Charisse Fisher  MRN: [id number]  : 1945  Date of Evaluation: 2023  ED Provider:  Jacklyn Huertas MD    Triage Chief Complaint:   Dizziness (Dizziness, PACs on monitor, no chest pain)    [place]:  Charisse Fisher is a 69 y.o. male that presents with complaint of dizziness. He reported to me that he had gotten up this morning had breakfast, everything was normal.  He was going to a trustees meeting and he became dizzy and felt like he could not walk. His balance has been off, he had some blurred vision. He had some nausea but no headache. He denies chest pain. No palpitations. No shortness of breath. ROS - see HPI, below listed is current ROS at time of my eval:  10 systems reviewed and negative except as above. Past Medical History:   Diagnosis Date    Abdominal ultrasound 12/15/2021    Normal study    BPH (benign prostatic hyperplasia) 2021    Diabetes mellitus ([address])     History of Doppler ultrasound 2018    arterial duplex-no significant disease    History of exercise stress test 2017    treadmill    Hx of Doppler echocardiogram 2017    EF50-60%,normal    Hx of Doppler ultrasound 2022    Evidence of partially occlusive chronic DVT in the left proximal CFV, PFV. Proximal FV, and popliteal V, and partially occlusive chronic SVT in the left GSV SFJ, GSV mid thigh,and distal SSV. Occlusive chronic DVT in the let mid and distal FV and occlusive chronic SVT in the left GSV knee and mid calf. Possible chronic, onon occlusive DVT of the mid PTV. Hx of exercise stress test 2021    Treadmill. Hypertension     Lower extremity doppler 12/15/2021    Partially occlusive chronic DVT in the left proximal CFV, proximal deep femoral vein, mid femoral vein, distal femoral vein, and GSV SFJ, GSV mid thigh, and GSV knee.     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 ([address]) 11/10/2017     Past Surgical History:

## 2023-11-15 VITALS
HEART RATE: 83 BPM | WEIGHT: 185 LBS | DIASTOLIC BLOOD PRESSURE: 74 MMHG | OXYGEN SATURATION: 97 % | HEIGHT: 68 IN | BODY MASS INDEX: 28.04 KG/M2 | SYSTOLIC BLOOD PRESSURE: 135 MMHG | TEMPERATURE: 98 F | RESPIRATION RATE: 18 BRPM

## 2023-11-15 LAB
ALBUMIN SERPL-MCNC: 3.5 GM/DL (ref 3.4–5)
ALP BLD-CCNC: 128 IU/L (ref 40–128)
ALT SERPL-CCNC: 60 U/L (ref 10–40)
ANION GAP SERPL CALCULATED.3IONS-SCNC: 13 MMOL/L (ref 4–16)
AST SERPL-CCNC: 43 IU/L (ref 15–37)
BILIRUB SERPL-MCNC: 0.5 MG/DL (ref 0–1)
BUN SERPL-MCNC: 18 MG/DL (ref 6–23)
CALCIUM SERPL-MCNC: 9.6 MG/DL (ref 8.3–10.6)
CHLORIDE BLD-SCNC: 103 MMOL/L (ref 99–110)
CHOLEST SERPL-MCNC: 89 MG/DL
CO2: 25 MMOL/L (ref 21–32)
CREAT SERPL-MCNC: 0.9 MG/DL (ref 0.9–1.3)
ESTIMATED AVERAGE GLUCOSE: 163 MG/DL
GFR SERPL CREATININE-BSD FRML MDRD: >60 ML/MIN/1.73M2
GLUCOSE BLD-MCNC: 111 MG/DL (ref 70–99)
GLUCOSE BLD-MCNC: 124 MG/DL (ref 70–99)
GLUCOSE SERPL-MCNC: 97 MG/DL (ref 70–99)
HBA1C MFR BLD: 7.3 % (ref 4.2–6.3)
HCT VFR BLD CALC: 42.4 % (ref 42–52)
HDLC SERPL-MCNC: 32 MG/DL
HEMOGLOBIN: 13.8 GM/DL (ref 13.5–18)
LDLC SERPL CALC-MCNC: 38 MG/DL
MCH RBC QN AUTO: 33.1 PG (ref 27–31)
MCHC RBC AUTO-ENTMCNC: 32.5 % (ref 32–36)
MCV RBC AUTO: 101.7 FL (ref 78–100)
PDW BLD-RTO: 13.9 % (ref 11.7–14.9)
PLATELET # BLD: 115 K/CU MM (ref 140–440)
PMV BLD AUTO: 9.8 FL (ref 7.5–11.1)
POTASSIUM SERPL-SCNC: 4.1 MMOL/L (ref 3.5–5.1)
RBC # BLD: 4.17 M/CU MM (ref 4.6–6.2)
SODIUM BLD-SCNC: 141 MMOL/L (ref 135–145)
TOTAL PROTEIN: 6 GM/DL (ref 6.4–8.2)
TRIGL SERPL-MCNC: 95 MG/DL
WBC # BLD: 4.8 K/CU MM (ref 4–10.5)

## 2023-11-15 PROCEDURE — 94761 N-INVAS EAR/PLS OXIMETRY MLT: CPT

## 2023-11-15 PROCEDURE — 96361 HYDRATE IV INFUSION ADD-ON: CPT

## 2023-11-15 PROCEDURE — 80053 COMPREHEN METABOLIC PANEL: CPT

## 2023-11-15 PROCEDURE — 99223 1ST HOSP IP/OBS HIGH 75: CPT | Performed by: PSYCHIATRY & NEUROLOGY

## 2023-11-15 PROCEDURE — APPSS30 APP SPLIT SHARED TIME 16-30 MINUTES

## 2023-11-15 PROCEDURE — G0378 HOSPITAL OBSERVATION PER HR: HCPCS

## 2023-11-15 PROCEDURE — 82962 GLUCOSE BLOOD TEST: CPT

## 2023-11-15 PROCEDURE — 2580000003 HC RX 258: Performed by: PHYSICIAN ASSISTANT

## 2023-11-15 PROCEDURE — 85027 COMPLETE CBC AUTOMATED: CPT

## 2023-11-15 PROCEDURE — 36415 COLL VENOUS BLD VENIPUNCTURE: CPT

## 2023-11-15 PROCEDURE — 6370000000 HC RX 637 (ALT 250 FOR IP): Performed by: PHYSICIAN ASSISTANT

## 2023-11-15 PROCEDURE — 80061 LIPID PANEL: CPT

## 2023-11-15 PROCEDURE — 83036 HEMOGLOBIN GLYCOSYLATED A1C: CPT

## 2023-11-15 RX ORDER — ATORVASTATIN CALCIUM 80 MG/1
40 TABLET, FILM COATED ORAL DAILY
Qty: 30 TABLET | Refills: 3
Start: 2023-11-15

## 2023-11-15 RX ADMIN — SODIUM CHLORIDE, PRESERVATIVE FREE 10 ML: 5 INJECTION INTRAVENOUS at 08:18

## 2023-11-15 RX ADMIN — APIXABAN 5 MG: 5 TABLET, FILM COATED ORAL at 14:29

## 2023-11-15 RX ADMIN — METOPROLOL SUCCINATE 25 MG: 25 TABLET, EXTENDED RELEASE ORAL at 08:18

## 2023-11-15 RX ADMIN — LEVOTHYROXINE SODIUM 50 MCG: 0.05 TABLET ORAL at 06:18

## 2023-11-15 NOTE — DISCHARGE INSTRUCTIONS
Follow-up with your PCP to have your liver enzymes and platelet count rechecked. Your MRI showed a small, very likely benign brain tumor that has likely been stable since 2020, but neurology would like you to follow-up with your primary care and a neurosurgeon for evaluation. Your Lipitor has been reduced to 40mg  due to elevated liver enzymes. Please have the liver enzymes rechecked and discuss this with your PCP.

## 2023-11-15 NOTE — CONSULTS
Chart reviewed full note to follow                      Name:  Efraín Arreaga /Age/Sex: 1945  (69 y.o. male)   MRN & CSN:  [id number] & [id number] Admission Date/Time: 2023  9:25 AM   Location:  OBS  PCP: Balbina Mccall, [address] [place] [address] Day: 1          Referring physician:  Sherrill Sánchez MD         Reason for consultation: Arrhythmias APCs and PVCs        Thanks for referral.    Information source: Patient    CC; dizziness      HPI:   Thank you for involving me in taking  care of Efraín Arreaga who  is a 69 y. o.year  Old male  Presents with history of for CAD, history of PCI of the LAD stent cath showed patent LAD stent patient is on GDMT, DVT on Eliquis, hypertension, hypothyroidism, brain mass stable frontal area    now admitted with dizziness and lightheadedness was found to have frequent APCs and hence cardiology has been consulted                 Past medical history:    has a past medical history of Abdominal ultrasound, BPH (benign prostatic hyperplasia), Diabetes mellitus ([address]), History of Doppler ultrasound, History of exercise stress test, Hx of Doppler echocardiogram, Hx of Doppler ultrasound, Hx of exercise stress test, Hypertension, Lower extremity doppler, and 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 Past surgical history:   has a past surgical history that includes Tonsillectomy; back surgery; Colonoscopy (4/3/14); Endoscopy, colon, diagnostic; and Endoscopy, colon, diagnostic (2017). Social History:   reports that he has never smoked. He has never used smokeless tobacco. He reports current alcohol use of about 1.0 standard drink of alcohol per week. He reports that he does not use drugs. Family history:  family history includes Heart Disease in his brother and father.     No Known Allergies    aspirin EC tablet 81 mg, Nightly  [START ON 11/15/2023] metoprolol succinate (TOPROL XL) extended release tablet 25 mg, Daily  [START ON
evaluation please feel free to contact us. [address] [place] [address], JELANI [facility], 11/15/2023     Patient with transient dizziness that is resolved. He has a mass on ct the has not changed in years. He is ready to go home. Will fu with pcp. Exam is nonfocal.     Attending Note:  I have rounded on this patient with Song HESSCNP. I have reviewed the chart and we have discussed this case in detail. The patient was seen and examined by myself. Pertinent labs and imaging have been personally reviewed. Our findings and impressions were discussed with the patient. I concur with the Nurse Practioner's assessment and plan.     Donato Ahmadi, DO

## 2023-11-15 NOTE — PROGRESS NOTES
Orthostatic Vitals:      11/15/2023    11:43 AM   Orthostatic Vitals   Orthostatic B/P and Pulse?  Yes   Blood Pressure Lying 147/75   Pulse Lying 57 PER MINUTE   Blood Pressure Sitting 134/80   Pulse Sitting 76 PER MINUTE   Blood Pressure Standing 130/72   Pulse Standing 90 PER MINUTE

## 2023-12-07 ENCOUNTER — HOSPITAL ENCOUNTER (OUTPATIENT)
Dept: CT IMAGING | Age: 78
Discharge: HOME | End: 2023-12-07
Attending: INTERNAL MEDICINE
Payer: MEDICARE

## 2023-12-07 DIAGNOSIS — R91.1 LUNG NODULE: ICD-10-CM

## 2023-12-07 PROCEDURE — 71250 CT THORAX DX C-: CPT

## 2024-01-31 RX ORDER — ASPIRIN 81 MG/1
81 TABLET, COATED ORAL DAILY
Qty: 90 TABLET | Refills: 1 | Status: SHIPPED | OUTPATIENT
Start: 2024-01-31

## 2024-03-05 ENCOUNTER — OFFICE VISIT (OUTPATIENT)
Dept: CARDIOLOGY CLINIC | Age: 79
End: 2024-03-05
Payer: MEDICARE

## 2024-03-05 VITALS
DIASTOLIC BLOOD PRESSURE: 60 MMHG | BODY MASS INDEX: 29.49 KG/M2 | HEART RATE: 74 BPM | OXYGEN SATURATION: 96 % | SYSTOLIC BLOOD PRESSURE: 110 MMHG | WEIGHT: 194.6 LBS | HEIGHT: 68 IN

## 2024-03-05 DIAGNOSIS — E78.5 DYSLIPIDEMIA: ICD-10-CM

## 2024-03-05 DIAGNOSIS — I25.10 CORONARY ARTERY DISEASE INVOLVING NATIVE CORONARY ARTERY OF NATIVE HEART WITHOUT ANGINA PECTORIS: ICD-10-CM

## 2024-03-05 DIAGNOSIS — Z86.718 H/O DEEP VENOUS THROMBOSIS: ICD-10-CM

## 2024-03-05 DIAGNOSIS — I10 PRIMARY HYPERTENSION: Primary | ICD-10-CM

## 2024-03-05 PROCEDURE — 1123F ACP DISCUSS/DSCN MKR DOCD: CPT | Performed by: NURSE PRACTITIONER

## 2024-03-05 PROCEDURE — 3074F SYST BP LT 130 MM HG: CPT | Performed by: NURSE PRACTITIONER

## 2024-03-05 PROCEDURE — 99214 OFFICE O/P EST MOD 30 MIN: CPT | Performed by: NURSE PRACTITIONER

## 2024-03-05 PROCEDURE — 3078F DIAST BP <80 MM HG: CPT | Performed by: NURSE PRACTITIONER

## 2024-03-05 RX ORDER — ATORVASTATIN CALCIUM 80 MG/1
80 TABLET, FILM COATED ORAL DAILY
Qty: 90 TABLET | Refills: 3 | Status: SHIPPED | OUTPATIENT
Start: 2024-03-05

## 2024-03-05 NOTE — PATIENT INSTRUCTIONS
Please be informed that if you contact our office outside of normal business hours the physician on call cannot help with any scheduling or rescheduling issues, procedure instruction questions or any type of medication issue.    We advise you for any urgent/emergency that you go to the nearest emergency room!    PLEASE CALL OUR OFFICE DURING NORMAL BUSINESS HOURS    Monday - Friday   8 am to 5 pm    [place]: [phone]    [place]: [phone]    [place]:  [phone]    **It is YOUR responsibilty to bring medication bottles and/or updated medication list to EACH APPOINTMENT. This will allow us to better serve you and all your healthcare needs**    Thank you for allowing us to care for you today!   We want to ensure we can follow your treatment plan and we strive to give you the best outcomes and experience possible.   If you ever have a life threatening emergency and call 911 - for an ambulance (EMS)   Our providers can only care for you at:   [facility] or [facility].   Even if you have someone take you or you drive yourself we can only care for you in a [facility] facility. Our providers are not setup at the other healthcare locations!

## 2024-03-05 NOTE — PROGRESS NOTES
exercise stress test 12/06/2021    Treadmill.    Hypertension     Lower extremity doppler 12/15/2021    Partially occlusive chronic DVT in the left proximal CFV, proximal deep femoral vein, mid femoral vein, distal femoral vein, and GSV SFJ, GSV mid thigh, and GSV knee.    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 (HCC) 11/10/2017       Current Outpatient Medications   Medication Sig Dispense Refill    ASPIRIN LOW DOSE 81 MG EC tablet TAKE 1 TABLET BY MOUTH EVERY DAY 90 tablet 1    atorvastatin (LIPITOR) 80 MG tablet Take 0.5 tablets by mouth daily 30 tablet 3    lisinopril (PRINIVIL;ZESTRIL) 10 MG tablet Take 1 tablet by mouth daily      apixaban (ELIQUIS) 5 MG TABS tablet Take 1 tablet by mouth 2 times daily 180 tablet 3    metoprolol succinate (TOPROL XL) 25 MG extended release tablet Take 1 tablet by mouth daily 90 tablet 3    JARDIANCE 25 MG tablet Take 1 tablet by mouth daily      Dulaglutide 3 MG/0.5ML SOPN Inject 3 mg into the skin once a week Takes on Sunday      SYNTHROID 50 MCG tablet Take 1 tablet by mouth Daily      glimepiride (AMARYL) 2 MG tablet Take 1 tablet by mouth daily      metFORMIN (GLUCOPHAGE) 500 MG tablet Take 2 tablets by mouth 2 times daily       No current facility-administered medications for this visit.       Review of Systems:  Review of Systems   Cardiovascular:  Negative for chest pain, palpitations and leg swelling.   Musculoskeletal: Negative.    Skin: Negative.    Neurological:  Negative for dizziness and weakness.   All other systems reviewed and are negative.         Objective:      Physical Exam:  /60 (Site: Left Upper Arm, Position: Sitting, Cuff Size: Large Adult)   Pulse 74   Ht 1.727 m (5' 7.99\")   Wt 88.3 kg (194 lb 9.6 oz)   SpO2 96%   BMI 29.60 kg/m²   Wt Readings from Last 3 Encounters:   03/05/24 88.3 kg (194 lb 9.6 oz)   12/19/23 83.9 kg (185 lb)   11/14/23 83.9 kg (185 lb)     Body mass index is 29.6 kg/m².    Physical exam:  Physical

## 2024-04-30 ENCOUNTER — TELEPHONE (OUTPATIENT)
Dept: CARDIOLOGY CLINIC | Age: 79
End: 2024-04-30

## 2024-04-30 NOTE — TELEPHONE ENCOUNTER
[facility] has told patient that his Eliquis was going to be 300.00.  Patient unable to afford this, please advise.

## 2024-05-03 NOTE — TELEPHONE ENCOUNTER
No PA needed for this medication. Covered by plan. (Patient may have hit the donut hole.) Please give patient samples and an assistance packet to see if he will qualify for assistance.

## 2024-06-27 ENCOUNTER — HOSPITAL ENCOUNTER (OUTPATIENT)
Dept: CT IMAGING | Age: 79
Discharge: HOME | End: 2024-06-27
Attending: INTERNAL MEDICINE
Payer: MEDICARE

## 2024-06-27 DIAGNOSIS — R91.1 LUNG NODULE: ICD-10-CM

## 2024-06-27 PROCEDURE — 71250 CT THORAX DX C-: CPT

## 2024-08-05 RX ORDER — ASPIRIN 81 MG/1
81 TABLET ORAL DAILY
Qty: 90 TABLET | Refills: 1 | Status: SHIPPED | OUTPATIENT
Start: 2024-08-05

## 2024-09-05 ENCOUNTER — OFFICE VISIT (OUTPATIENT)
Dept: CARDIOLOGY CLINIC | Age: 79
End: 2024-09-05

## 2024-09-05 VITALS
WEIGHT: 189.4 LBS | OXYGEN SATURATION: 95 % | BODY MASS INDEX: 28.7 KG/M2 | SYSTOLIC BLOOD PRESSURE: 112 MMHG | HEART RATE: 83 BPM | DIASTOLIC BLOOD PRESSURE: 60 MMHG | HEIGHT: 68 IN

## 2024-09-05 DIAGNOSIS — I25.10 CORONARY ARTERY DISEASE INVOLVING NATIVE CORONARY ARTERY OF NATIVE HEART WITHOUT ANGINA PECTORIS: Primary | ICD-10-CM

## 2024-09-05 DIAGNOSIS — Z86.718 H/O DEEP VENOUS THROMBOSIS: ICD-10-CM

## 2024-09-05 DIAGNOSIS — I10 PRIMARY HYPERTENSION: ICD-10-CM

## 2024-09-05 DIAGNOSIS — E78.5 DYSLIPIDEMIA: ICD-10-CM

## 2024-09-05 RX ORDER — ATORVASTATIN CALCIUM 40 MG/1
40 TABLET, FILM COATED ORAL DAILY
Qty: 30 TABLET | Refills: 2 | Status: SHIPPED | OUTPATIENT
Start: 2024-09-05

## 2024-09-05 NOTE — PROGRESS NOTES
[place] [address]  Phone: [phone]    Fax [phone]    Sommer Segovia MD, [facility]  Simon Polk MD, [facility]   Jam Garcia MD, [facility] MD Brii Jorgensen MD, [facility]  Héctor Mcdonough MD, [facility]    Michael Romo MD, [facility]  Myrtle Gupta MD, [facility]  Leela Orourke, APRN  Ellen Ibarra, APRN  Floridalma Robertson, APRN  Juan Hagen, APRN      Cardiology Progress Note      9/5/2024    RE: Pk Lang  (1945)                             Primary cardiologist: Dr. Michelle Nguyen       Subjective:  CC:   1. Coronary artery disease involving native coronary artery of native heart without angina pectoris    2. Primary hypertension    3. Dyslipidemia    4. H/O deep venous thrombosis          HPI: Pk Lang, who is a  78 y.o. year old male with a past medical history as listed below.  Patient presents to the office for follow up on CAD (PCI of LAD in 2017), HTN, H/O DVT, and hyperlipidemia. Patient is  an active male who walks regularly. Patient is  compliant with medications.  Patient denies any chest pain, shortness of breath, dizziness, syncope, or palpitations.    Past Medical History:   Diagnosis Date    Abdominal ultrasound 12/15/2021    Normal study    BPH (benign prostatic hyperplasia) 03/13/2021    Diabetes mellitus (HCC)     History of Doppler ultrasound 07/26/2018    arterial duplex-no significant disease    History of exercise stress test 11/30/2017    treadmill    Hx of Doppler echocardiogram 11/30/2017    EF50-60%,normal    Hx of Doppler ultrasound 06/22/2022    Evidence of partially occlusive chronic DVT in the left proximal CFV, PFV. Proximal FV, and popliteal V, and partially occlusive chronic SVT in the left GSV SFJ, GSV mid thigh,and distal SSV. Occlusive chronic DVT in the let mid and distal FV and occlusive chronic SVT in the left GSV knee and mid calf. Possible chronic, onon occlusive DVT of the mid PTV.    Hx

## 2024-09-05 NOTE — PATIENT INSTRUCTIONS
**It is YOUR responsibilty to bring medication bottles and/or updated medication list to EACH APPOINTMENT. This will allow us to better serve you and all your healthcare needs**  Thank you for allowing us to care for you today!   We want to ensure we can follow your treatment plan and we strive to give you the best outcomes and experience possible.   If you ever have a life threatening emergency and call 911 - for an ambulance (EMS)   Our providers can only care for you at:   [facility] or [facility].   Even if you have someone take you or you drive yourself we can only care for you in a [facility]. Our providers are not setup at the other healthcare locations!   Please be informed that if you contact our office outside of normal business hours the physician on call cannot help with any scheduling or rescheduling issues, procedure instruction questions or any type of medication issue.    We advise you for any urgent/emergency that you go to the nearest emergency room!    PLEASE CALL OUR OFFICE DURING NORMAL BUSINESS HOURS    Monday - Friday   8 am to 5 pm    [place]: [phone]    [place]: [phone]    [place]:  [phone]  We are committed to providing you the best care possible.    If you receive a survey after visiting one of our offices, please take time to share your experience concerning your physician office visit.  These surveys are confidential and no health information about you is shared.    We are eager to improve for you and we are counting on your feedback to help make that happen.

## 2024-09-09 NOTE — LETTER
Eulalia Lang  1945  [id number]    Have you had any Chest Pain that is not new? - No     DO EKG IF: Patient has a Heart Rate above 100 or below 40     CAD (Coronary Artery Disease) patient should have one on file every 6 months        Have you had any Shortness of Breath - No    Have you had any dizziness - No      Have you had any palpitations that are not new? - No    Is the patient on any of the following medications - N/A  If Yes DO EKG - Needs done every 6 months    Do you have any edema - swelling in No        When did you have your last labs drawn 6 weeks ago  Where did you have them done [facility]  What doctor ordered Dr Yesy Franklin    If we do not have these labs you are retrieve these labs for these providers!     Do you have a surgery or procedure scheduled in the near future - No       Ask patient if they want to sign up for MyChart if they are not already signed up     Check to see if we have an E-MAIL on file for the patient     Check medication list thoroughly!!! AND RECONCILE OUTSIDE MEDICATIONS  If dose has changed change the entire order not just the MG  BE SURE TO ASK PATIENT IF THEY NEED MEDICATION REFILLS     At check out add to every patient's \"wrap up\" the following dot phrase AFTERHOURSEDUCATION and ensure we explain this to our patients In regards to the Event (or) MCT monitor you ordered for KaleyPRETTY Bland, a report meeting notification criteria was received from [employer] on 9/9/2024 and uploaded into Epic for your review.    Nicci Cowan   [facility]

## 2024-11-05 ENCOUNTER — HOSPITAL ENCOUNTER (OUTPATIENT)
Dept: CT IMAGING | Age: 79
Discharge: HOME | End: 2024-11-05
Attending: SPECIALIST
Payer: MEDICARE

## 2024-11-05 DIAGNOSIS — R31.0 GROSS HEMATURIA: ICD-10-CM

## 2024-11-05 LAB
EGFR, POC: 78 ML/MIN/1.73M2
POC CREATININE: 1 MG/DL (ref 0.5–1.2)

## 2024-11-05 PROCEDURE — 82565 ASSAY OF CREATININE: CPT

## 2024-11-05 PROCEDURE — 6360000004 HC RX CONTRAST MEDICATION: Performed by: SPECIALIST

## 2024-11-05 PROCEDURE — 74178 CT ABD&PLV WO CNTR FLWD CNTR: CPT

## 2024-11-05 RX ORDER — IOPAMIDOL 755 MG/ML
80 INJECTION, SOLUTION INTRAVASCULAR
Status: COMPLETED | OUTPATIENT
Start: 2024-11-05 | End: 2024-11-05

## 2024-11-05 RX ADMIN — IOPAMIDOL 80 ML: 755 INJECTION, SOLUTION INTRAVENOUS at 11:17

## 2024-12-06 ENCOUNTER — HOSPITAL ENCOUNTER (OUTPATIENT)
Dept: CT IMAGING | Age: 79
Discharge: HOME | End: 2024-12-06
Attending: INTERNAL MEDICINE
Payer: MEDICARE

## 2024-12-06 DIAGNOSIS — R91.1 LUNG NODULE: ICD-10-CM

## 2024-12-06 PROCEDURE — 71250 CT THORAX DX C-: CPT

## 2024-12-09 ENCOUNTER — OFFICE VISIT (OUTPATIENT)
Dept: CARDIOLOGY CLINIC | Age: 79
End: 2024-12-09
Payer: MEDICARE

## 2024-12-09 VITALS
OXYGEN SATURATION: 97 % | DIASTOLIC BLOOD PRESSURE: 64 MMHG | SYSTOLIC BLOOD PRESSURE: 120 MMHG | HEIGHT: 68 IN | BODY MASS INDEX: 27.55 KG/M2 | WEIGHT: 181.8 LBS | HEART RATE: 56 BPM

## 2024-12-09 DIAGNOSIS — E78.5 DYSLIPIDEMIA: ICD-10-CM

## 2024-12-09 DIAGNOSIS — I10 PRIMARY HYPERTENSION: ICD-10-CM

## 2024-12-09 DIAGNOSIS — Z86.718 H/O DEEP VENOUS THROMBOSIS: ICD-10-CM

## 2024-12-09 DIAGNOSIS — I25.10 CORONARY ARTERY DISEASE INVOLVING NATIVE CORONARY ARTERY OF NATIVE HEART WITHOUT ANGINA PECTORIS: Primary | ICD-10-CM

## 2024-12-09 PROCEDURE — 3074F SYST BP LT 130 MM HG: CPT | Performed by: NURSE PRACTITIONER

## 2024-12-09 PROCEDURE — 1123F ACP DISCUSS/DSCN MKR DOCD: CPT | Performed by: NURSE PRACTITIONER

## 2024-12-09 PROCEDURE — 99214 OFFICE O/P EST MOD 30 MIN: CPT | Performed by: NURSE PRACTITIONER

## 2024-12-09 PROCEDURE — 1159F MED LIST DOCD IN RCRD: CPT | Performed by: NURSE PRACTITIONER

## 2024-12-09 PROCEDURE — 3078F DIAST BP <80 MM HG: CPT | Performed by: NURSE PRACTITIONER

## 2024-12-09 RX ORDER — METOPROLOL SUCCINATE 25 MG/1
12.5 TABLET, EXTENDED RELEASE ORAL DAILY
Qty: 90 TABLET | Refills: 3 | Status: SHIPPED | OUTPATIENT
Start: 2024-12-09

## 2024-12-09 NOTE — PROGRESS NOTES
[place] [address]  Phone: [phone]    Fax [phone]    Sommer Segovia MD, [facility]  Simon Polk MD, [facility]   Jam Garcia MD, [facility] MD Brii Jorgensen MD, [facility]  Héctor Mcdonough MD, [facility]    Michael Romo MD, [facility]  Myrtle Gupta MD, [facility]  Leela Orourke, APRN  Ellen Ibarra, APRN  Floridalma Robertson, APRN  Juan Hagen, APRN      Cardiology Progress Note      12/9/2024    RE: Pk Lang  (1945)                             Primary cardiologist: Dr. Michelle Nguyen       Subjective:  CC:   1. Coronary artery disease involving native coronary artery of native heart without angina pectoris    2. Primary hypertension    3. Dyslipidemia    4. H/O deep venous thrombosis            HPI: Pk Lang, who is a  79 y.o. year old male with a past medical history as listed below.  Patient presents to the office for follow up on CAD (PCI of LAD in 2017), HTN, H/O DVT, and hyperlipidemia. Patient is  an active male who walks regularly. Patient is  compliant with medications.  Patient denies any chest pain, shortness of breath, dizziness, syncope, or palpitations.    Past Medical History:   Diagnosis Date    Abdominal ultrasound 12/15/2021    Normal study    BPH (benign prostatic hyperplasia) 03/13/2021    Diabetes mellitus (HCC)     History of Doppler ultrasound 07/26/2018    arterial duplex-no significant disease    History of exercise stress test 11/30/2017    treadmill    Hx of Doppler echocardiogram 11/30/2017    EF50-60%,normal    Hx of Doppler ultrasound 06/22/2022    Evidence of partially occlusive chronic DVT in the left proximal CFV, PFV. Proximal FV, and popliteal V, and partially occlusive chronic SVT in the left GSV SFJ, GSV mid thigh,and distal SSV. Occlusive chronic DVT in the let mid and distal FV and occlusive chronic SVT in the left GSV knee and mid calf. Possible chronic, onon occlusive DVT of the mid PTV.

## 2024-12-16 ENCOUNTER — TELEPHONE (OUTPATIENT)
Dept: CARDIOLOGY CLINIC | Age: 79
End: 2024-12-16

## 2024-12-16 NOTE — TELEPHONE ENCOUNTER
Cardiologist: Dr. Nguyen  Surgeon: Dr. Alberts  Surgery: TURBT  Surgery/Procedure should be done in the hospital setting    _____ yes    _____  no    Anesthesia: General  Date: 12/31/2024  Fax# [phone]  # [phone]    Last OV 12/9/2024 w/Juan    CAD (coronary artery disease)  -PCI of LAD in 2017. LHC 8/10/23 showed patent stent.      Primary and secondary prevention discussed with patient:              -Low sodium cardiac diet              -exercise 30 min a day three days a week  Continue current medications lisinopril, Lipitor, Toprol, ASA     Dyslipidemia  -At or near goal Yes, LDL 38. A1C is 7.3. Continue with lipitor 40 mg.  Hepatic function panel WNL. No abdominal pain. No myalgias.     Hypertension  -Stable, continue lisinopril 10 mg daily and C/O fatigue with bradycardia, reduce dose of Toprol-XL to  12.5 mg daily.     H/O deep venous thrombosis  -Extensive DVT of left lower extremity found 9/24/20 while hospitalized for COVID-19 infection.  Patient had several follow-up ultrasounds following anticoagulation therapy.  Most recent ultrasound in 2021 showed complete resolution of DVT.       Last EKG- 9/5/2024      NM- 7/24/2023  abnormal stress test, normal LVEF, normal EDV, Myocardial perfusion scan   shows moderate size,severe intensity, partially reversible perfusion defect   in tosha-apical wall.      Echo- 11/14/2023  Contrast used: Definity.    Left Ventricle: Normal left ventricular systolic function with a visually estimated EF of 55 - 60%.  Mild apical hypokinesis and a very small area correlate with her LAD MI in the past    No significant valvular disease noted.    Pericardium: No pericardial effusion.    Interatrial Septum: Inadeqaute bubble study; color doppler does not suggest PFO or ASD.      Cath- 8/10/2023   patent LAD stent, normal rest of coronaries       Eliquis    Aspirin

## 2024-12-18 NOTE — PROGRESS NOTES
Instructions reviewed with Pk and wife Stacey    Surgery @ [facility] on 12/31/24 you will be called 12/30/24 with times    NOTHING TO EAT OR DRINK AFTER MIDNIGHT DAY OF SURGERY    1. Enter thru the hospital main entrance on day of surgery, check in at the Information Desk. If you arrive prior to 6:00am, enter thru the ER entrance.    2. Follow the directions as prescribed by the doctor for your procedure and medications.         Morning of surgery take:  Metoprolol & Synthroid with sips of water         Stop vitamins, supplements and NSAIDS: 12/24/24         Last dose Jardiance: 12/27/24         Last dose Eliquis: 12/28/24          Hold Dulaglutide 12/29/24    3. Check with your Doctor regarding stopping blood thinners and follow their instructions.    4. Do not smoke, vape or use chewing tobacco morning of surgery. Do not drink any alcoholic beverages 24 hours prior to surgery.       This includes NA Beer. No street drugs 7 days prior to surgery.    5. If you have dentures, contacts of glasses they will be removed before going to the OR; please bring a case.    6. Please bring picture ID, insurance card, paperwork from the doctor’s office (H & P, Consent, & card for implantable devices).    7. Take a shower with an antibacterial soap the night before surgery and the morning of surgery. Do not put anything on your skin      After your morning shower.    8. You will need a responsible adult to drive you home and check on you after surgery.

## 2024-12-30 ENCOUNTER — ANESTHESIA EVENT (OUTPATIENT)
Dept: OPERATING ROOM | Age: 79
End: 2024-12-30
Payer: MEDICARE

## 2024-12-30 NOTE — ANESTHESIA PRE PROCEDURE
intravenous.      Anesthetic plan and risks discussed with patient.    Use of blood products discussed with patient whom consented to blood products.    Plan discussed with CRNA.    Attending anesthesiologist reviewed and agrees with Preprocedure content              JELANI Majano - CRNA   12/30/2024

## 2024-12-30 NOTE — PROGRESS NOTES
12/30/24 - .Notified patient surgery @ [facility] on  12/31/24 @ 0930, arrival 0800. NPO status and morning medications reviewed. Understanding verbalized.

## 2024-12-31 ENCOUNTER — HOSPITAL ENCOUNTER (OUTPATIENT)
Age: 79
Setting detail: OUTPATIENT SURGERY
Discharge: HOME | End: 2024-12-31
Attending: SPECIALIST | Admitting: SPECIALIST
Payer: MEDICARE

## 2024-12-31 ENCOUNTER — ANESTHESIA (OUTPATIENT)
Dept: OPERATING ROOM | Age: 79
End: 2024-12-31
Payer: MEDICARE

## 2024-12-31 VITALS
DIASTOLIC BLOOD PRESSURE: 74 MMHG | OXYGEN SATURATION: 94 % | WEIGHT: 181 LBS | TEMPERATURE: 97.6 F | HEIGHT: 68 IN | BODY MASS INDEX: 27.43 KG/M2 | HEART RATE: 81 BPM | SYSTOLIC BLOOD PRESSURE: 128 MMHG | RESPIRATION RATE: 16 BRPM

## 2024-12-31 DIAGNOSIS — R31.0 GROSS HEMATURIA: ICD-10-CM

## 2024-12-31 LAB
ANION GAP SERPL CALCULATED.3IONS-SCNC: 11 MMOL/L (ref 9–17)
BUN SERPL-MCNC: 16 MG/DL (ref 7–20)
CALCIUM SERPL-MCNC: 9 MG/DL (ref 8.3–10.6)
CHLORIDE SERPL-SCNC: 104 MMOL/L (ref 99–110)
CO2 SERPL-SCNC: 21 MMOL/L (ref 21–32)
CREAT SERPL-MCNC: 0.9 MG/DL (ref 0.8–1.3)
GFR, ESTIMATED: 80 ML/MIN/1.73M2
GLUCOSE BLD-MCNC: 120 MG/DL (ref 74–99)
GLUCOSE BLD-MCNC: 144 MG/DL (ref 74–99)
GLUCOSE SERPL-MCNC: 167 MG/DL (ref 74–99)
POTASSIUM SERPL-SCNC: 3.9 MMOL/L (ref 3.5–5.1)
SODIUM SERPL-SCNC: 136 MMOL/L (ref 136–145)

## 2024-12-31 PROCEDURE — 6370000000 HC RX 637 (ALT 250 FOR IP): Performed by: ANESTHESIOLOGY

## 2024-12-31 PROCEDURE — 6360000002 HC RX W HCPCS: Performed by: NURSE ANESTHETIST, CERTIFIED REGISTERED

## 2024-12-31 PROCEDURE — 7100000011 HC PHASE II RECOVERY - ADDTL 15 MIN: Performed by: SPECIALIST

## 2024-12-31 PROCEDURE — 3700000001 HC ADD 15 MINUTES (ANESTHESIA): Performed by: SPECIALIST

## 2024-12-31 PROCEDURE — 7100000001 HC PACU RECOVERY - ADDTL 15 MIN: Performed by: SPECIALIST

## 2024-12-31 PROCEDURE — 82962 GLUCOSE BLOOD TEST: CPT

## 2024-12-31 PROCEDURE — 6360000002 HC RX W HCPCS: Performed by: SPECIALIST

## 2024-12-31 PROCEDURE — 3600000003 HC SURGERY LEVEL 3 BASE: Performed by: SPECIALIST

## 2024-12-31 PROCEDURE — 2500000003 HC RX 250 WO HCPCS: Performed by: SPECIALIST

## 2024-12-31 PROCEDURE — 7100000010 HC PHASE II RECOVERY - FIRST 15 MIN: Performed by: SPECIALIST

## 2024-12-31 PROCEDURE — 80048 BASIC METABOLIC PNL TOTAL CA: CPT

## 2024-12-31 PROCEDURE — 7100000000 HC PACU RECOVERY - FIRST 15 MIN: Performed by: SPECIALIST

## 2024-12-31 PROCEDURE — 2580000003 HC RX 258: Performed by: SPECIALIST

## 2024-12-31 PROCEDURE — 2500000003 HC RX 250 WO HCPCS: Performed by: NURSE ANESTHETIST, CERTIFIED REGISTERED

## 2024-12-31 PROCEDURE — 3700000000 HC ANESTHESIA ATTENDED CARE: Performed by: SPECIALIST

## 2024-12-31 PROCEDURE — 2709999900 HC NON-CHARGEABLE SUPPLY: Performed by: SPECIALIST

## 2024-12-31 PROCEDURE — 3600000013 HC SURGERY LEVEL 3 ADDTL 15MIN: Performed by: SPECIALIST

## 2024-12-31 PROCEDURE — 88307 TISSUE EXAM BY PATHOLOGIST: CPT | Performed by: PATHOLOGY

## 2024-12-31 RX ORDER — ONDANSETRON 2 MG/ML
INJECTION INTRAMUSCULAR; INTRAVENOUS
Status: DISCONTINUED | OUTPATIENT
Start: 2024-12-31 | End: 2024-12-31 | Stop reason: SDUPTHER

## 2024-12-31 RX ORDER — OXYCODONE HYDROCHLORIDE 5 MG/1
5 TABLET ORAL
Status: DISCONTINUED | OUTPATIENT
Start: 2024-12-31 | End: 2024-12-31 | Stop reason: HOSPADM

## 2024-12-31 RX ORDER — SODIUM CHLORIDE 9 MG/ML
INJECTION, SOLUTION INTRAVENOUS PRN
Status: DISCONTINUED | OUTPATIENT
Start: 2024-12-31 | End: 2024-12-31 | Stop reason: HOSPADM

## 2024-12-31 RX ORDER — ESMOLOL HYDROCHLORIDE 10 MG/ML
INJECTION INTRAVENOUS
Status: DISCONTINUED | OUTPATIENT
Start: 2024-12-31 | End: 2024-12-31 | Stop reason: SDUPTHER

## 2024-12-31 RX ORDER — ASPIRIN 81 MG/1
81 TABLET, CHEWABLE ORAL ONCE
Status: COMPLETED | OUTPATIENT
Start: 2024-12-31 | End: 2024-12-31

## 2024-12-31 RX ORDER — SODIUM CHLORIDE 0.9 % (FLUSH) 0.9 %
5-40 SYRINGE (ML) INJECTION PRN
Status: DISCONTINUED | OUTPATIENT
Start: 2024-12-31 | End: 2024-12-31 | Stop reason: HOSPADM

## 2024-12-31 RX ORDER — ROCURONIUM BROMIDE 10 MG/ML
INJECTION, SOLUTION INTRAVENOUS
Status: DISCONTINUED | OUTPATIENT
Start: 2024-12-31 | End: 2024-12-31 | Stop reason: SDUPTHER

## 2024-12-31 RX ORDER — SODIUM CHLORIDE, SODIUM LACTATE, POTASSIUM CHLORIDE, CALCIUM CHLORIDE 600; 310; 30; 20 MG/100ML; MG/100ML; MG/100ML; MG/100ML
INJECTION, SOLUTION INTRAVENOUS CONTINUOUS
Status: DISCONTINUED | OUTPATIENT
Start: 2024-12-31 | End: 2024-12-31 | Stop reason: HOSPADM

## 2024-12-31 RX ORDER — LIDOCAINE HYDROCHLORIDE 20 MG/ML
INJECTION, SOLUTION INTRAVENOUS
Status: DISCONTINUED | OUTPATIENT
Start: 2024-12-31 | End: 2024-12-31 | Stop reason: SDUPTHER

## 2024-12-31 RX ORDER — DROPERIDOL 2.5 MG/ML
0.62 INJECTION, SOLUTION INTRAMUSCULAR; INTRAVENOUS
Status: DISCONTINUED | OUTPATIENT
Start: 2024-12-31 | End: 2024-12-31 | Stop reason: HOSPADM

## 2024-12-31 RX ORDER — ONDANSETRON 2 MG/ML
4 INJECTION INTRAMUSCULAR; INTRAVENOUS
Status: DISCONTINUED | OUTPATIENT
Start: 2024-12-31 | End: 2024-12-31 | Stop reason: HOSPADM

## 2024-12-31 RX ORDER — FENTANYL CITRATE 50 UG/ML
INJECTION, SOLUTION INTRAMUSCULAR; INTRAVENOUS
Status: DISCONTINUED | OUTPATIENT
Start: 2024-12-31 | End: 2024-12-31 | Stop reason: SDUPTHER

## 2024-12-31 RX ORDER — SODIUM CHLORIDE 0.9 % (FLUSH) 0.9 %
5-40 SYRINGE (ML) INJECTION EVERY 12 HOURS SCHEDULED
Status: DISCONTINUED | OUTPATIENT
Start: 2024-12-31 | End: 2024-12-31 | Stop reason: HOSPADM

## 2024-12-31 RX ORDER — NALOXONE HYDROCHLORIDE 0.4 MG/ML
INJECTION, SOLUTION INTRAMUSCULAR; INTRAVENOUS; SUBCUTANEOUS PRN
Status: DISCONTINUED | OUTPATIENT
Start: 2024-12-31 | End: 2024-12-31 | Stop reason: HOSPADM

## 2024-12-31 RX ORDER — MEPERIDINE HYDROCHLORIDE 25 MG/ML
12.5 INJECTION INTRAMUSCULAR; INTRAVENOUS; SUBCUTANEOUS EVERY 5 MIN PRN
Status: DISCONTINUED | OUTPATIENT
Start: 2024-12-31 | End: 2024-12-31 | Stop reason: HOSPADM

## 2024-12-31 RX ORDER — PROPOFOL 10 MG/ML
INJECTION, EMULSION INTRAVENOUS
Status: DISCONTINUED | OUTPATIENT
Start: 2024-12-31 | End: 2024-12-31 | Stop reason: SDUPTHER

## 2024-12-31 RX ORDER — LABETALOL HYDROCHLORIDE 5 MG/ML
10 INJECTION, SOLUTION INTRAVENOUS
Status: DISCONTINUED | OUTPATIENT
Start: 2024-12-31 | End: 2024-12-31 | Stop reason: HOSPADM

## 2024-12-31 RX ORDER — FENTANYL CITRATE 50 UG/ML
50 INJECTION, SOLUTION INTRAMUSCULAR; INTRAVENOUS EVERY 5 MIN PRN
Status: DISCONTINUED | OUTPATIENT
Start: 2024-12-31 | End: 2024-12-31 | Stop reason: HOSPADM

## 2024-12-31 RX ORDER — HYDRALAZINE HYDROCHLORIDE 20 MG/ML
10 INJECTION INTRAMUSCULAR; INTRAVENOUS
Status: DISCONTINUED | OUTPATIENT
Start: 2024-12-31 | End: 2024-12-31 | Stop reason: HOSPADM

## 2024-12-31 RX ADMIN — ROCURONIUM BROMIDE 50 MG: 10 INJECTION, SOLUTION INTRAVENOUS at 10:00

## 2024-12-31 RX ADMIN — ASPIRIN 81 MG: 81 TABLET, CHEWABLE ORAL at 08:50

## 2024-12-31 RX ADMIN — CEFAZOLIN 2000 MG: 2 INJECTION, POWDER, FOR SOLUTION INTRAMUSCULAR; INTRAVENOUS at 10:11

## 2024-12-31 RX ADMIN — FENTANYL CITRATE 50 MCG: 50 INJECTION, SOLUTION INTRAMUSCULAR; INTRAVENOUS at 10:15

## 2024-12-31 RX ADMIN — ONDANSETRON 4 MG: 2 INJECTION INTRAMUSCULAR; INTRAVENOUS at 10:06

## 2024-12-31 RX ADMIN — ESMOLOL HYDROCHLORIDE 20 MG: 10 INJECTION, SOLUTION INTRAVENOUS at 10:38

## 2024-12-31 RX ADMIN — PROPOFOL 150 MG: 10 INJECTION, EMULSION INTRAVENOUS at 10:00

## 2024-12-31 RX ADMIN — SODIUM CHLORIDE, POTASSIUM CHLORIDE, SODIUM LACTATE AND CALCIUM CHLORIDE: 600; 310; 30; 20 INJECTION, SOLUTION INTRAVENOUS at 08:00

## 2024-12-31 RX ADMIN — FENTANYL CITRATE 50 MCG: 50 INJECTION, SOLUTION INTRAMUSCULAR; INTRAVENOUS at 10:44

## 2024-12-31 RX ADMIN — LIDOCAINE HYDROCHLORIDE 100 MG: 20 INJECTION, SOLUTION INTRAVENOUS at 10:00

## 2024-12-31 ASSESSMENT — PAIN DESCRIPTION - PAIN TYPE
TYPE: SURGICAL PAIN
TYPE: SURGICAL PAIN

## 2024-12-31 ASSESSMENT — PAIN - FUNCTIONAL ASSESSMENT
PAIN_FUNCTIONAL_ASSESSMENT: ACTIVITIES ARE NOT PREVENTED
PAIN_FUNCTIONAL_ASSESSMENT: ACTIVITIES ARE NOT PREVENTED

## 2024-12-31 ASSESSMENT — PAIN DESCRIPTION - ONSET: ONSET: GRADUAL

## 2024-12-31 ASSESSMENT — PAIN DESCRIPTION - DESCRIPTORS
DESCRIPTORS: DISCOMFORT
DESCRIPTORS: DISCOMFORT

## 2024-12-31 ASSESSMENT — PAIN DESCRIPTION - LOCATION
LOCATION: PENIS
LOCATION: PENIS

## 2024-12-31 ASSESSMENT — PAIN SCALES - GENERAL
PAINLEVEL_OUTOF10: 5
PAINLEVEL_OUTOF10: 0
PAINLEVEL_OUTOF10: 5
PAINLEVEL_OUTOF10: 0

## 2024-12-31 ASSESSMENT — PAIN DESCRIPTION - FREQUENCY: FREQUENCY: INTERMITTENT

## 2024-12-31 NOTE — ANESTHESIA POSTPROCEDURE EVALUATION
Department of Anesthesiology  Postprocedure Note    Patient: Pk Lang  MRN: [id number]  YOB: 1945  Date of evaluation: 12/31/2024    Procedure Summary       Date: 12/31/24 Room / Location: [address]    Anesthesia Start: 0955 Anesthesia Stop: 1050    Procedure: CYSTOSCOPY TRANSURETHRAL RESECTION BLADDER TUMOR Diagnosis:       Gross hematuria      (Gross hematuria [R31.0])    Surgeons: Nena Alberts MD Responsible Provider: Ramone Daniels MD    Anesthesia Type: general ASA Status: 3            Anesthesia Type: No value filed.    Hannah Phase I: Hannah Score: 10    Hannah Phase II:      Anesthesia Post Evaluation    Patient location during evaluation: PACU  Patient participation: complete - patient participated  Level of consciousness: awake and alert  Pain score: 0  Airway patency: patent  Nausea & Vomiting: no vomiting and no nausea  Cardiovascular status: blood pressure returned to baseline and hemodynamically stable  Respiratory status: acceptable, spontaneous ventilation, nonlabored ventilation and nasal cannula  Hydration status: stable  Pain management: adequate    No notable events documented.

## 2024-12-31 NOTE — PROGRESS NOTES
1045: pt arrived to PACU. Monitors applied and alarms on. Report from Nedra BARTON and Chacoh GLORIA. 20 F catheter in place. Pt is tp leave catheter in place at d/c.pt awake and alert. Pt able to answer questions.   1050: ice chips given.  1100:

## 2024-12-31 NOTE — BRIEF OP NOTE
Brief Postoperative Note      Patient: Pk Lang  YOB: 1945  MRN: [id number]    Date of Procedure: 12/31/2024    Pre-Op Diagnosis Codes:      * Gross hematuria [R31.0]    Post-Op Diagnosis: Same       Procedure(s):  CYSTOSCOPY TRANSURETHRAL RESECTION BLADDER TUMOR    Surgeon(s):  Nena Alberts MD    Assistant:  * No surgical staff found *    Anesthesia: General    Estimated Blood Loss (mL): Minimal    Complications: None    Specimens:   ID Type Source Tests Collected by Time Destination   A : Bladder tumor Tissue Tissue SURGICAL PATHOLOGY Nena Alberts MD 12/31/2024 1023        Implants:  * No implants in log *      Drains:   Urinary Catheter 12/31/24 2 Way (Active)       Findings:  Infection Present At Time Of Surgery (PATOS) (choose all levels that have infection present):  No infection present  Other Findings: 4cm exophytic papillary tumor    Electronically signed by Nena Alberts MD on 12/31/2024 at 10:41 AM

## 2024-12-31 NOTE — PROGRESS NOTES
1240 assisted pt up in room, alegre cath leg bag placed on pt, instructed pt on how to cath care.emptied 250 urine from alegre bag.  1300 discharge instructions given, pt stated he feels like he is not producing enough urine, explained to pt 25 ml produced in the past 20 minutes is present in leg bag. Enc pt to drink extra amt fluids today.   1325 continues to produce more clear yellow urine, drained 60 ml  1340 pt up in room, dressed with assistance of wife  1345 discharged via w/c with family

## 2024-12-31 NOTE — DISCHARGE INSTRUCTIONS
[facility]  [phone]    Do not drive, work around machines or use equipment.  Do not drink any alcoholic beverages.  Do not smoke while alone.  Avoid making important decisions.  Plan to spend a quiet, relaxed evening @ home.  Resume normal activities as you begin to feel better.  Eat lightly for your first meal, then gradually increase your diet to what is normal for you.  In case of nausea, avoid food and drink only clear liquids.  Resume food as nausea ceases.  Notify your surgeon if you experience fever, chills, large amount of bleeding, difficulty breathing, persistent nausea and vomiting or any other disturbing problem.  Call for a follow-up appointment with your surgeon.

## 2024-12-31 NOTE — PROGRESS NOTES
1125 - received patient from pacu, report received from Priyanka BARTON, patient A&O, reports pain 5/10, denies nausea, family at bedside, call light within reach, given beverage  1205 - report given to Cristal NATH RN

## 2024-12-31 NOTE — OP NOTE
Operative Note      Patient: Pk Lang  YOB: 1945  MRN: [id number]    Date of Procedure: 12/31/2024    Pre-Op Diagnosis Codes:      * Gross hematuria [R31.0]    Post-Op Diagnosis: Same       Procedure(s):  CYSTOSCOPY TRANSURETHRAL RESECTION BLADDER TUMOR    Surgeon(s):  Nena Alberts MD    Assistant:   * No surgical staff found *    Anesthesia: General    Estimated Blood Loss (mL): Minimal    Complications: None    Specimens:   ID Type Source Tests Collected by Time Destination   A : Bladder tumor Tissue Tissue SURGICAL PATHOLOGY Nena Alberts MD 12/31/2024 1023        Implants:  * No implants in log *      Drains:   Urinary Catheter 12/31/24 2 Way (Active)       Findings:  Infection Present At Time Of Surgery (PATOS) (choose all levels that have infection present):  No infection present  Other Findings: 4cm bladder tumor rigth post wall    Detailed Description of Procedure:   This is a 71-year-old gentleman I saw for gross painless hematuria found to have a papillary exophytic somewhat flat bladder tumor right posterior lateral wall he is here now for formal excision with trans resection of bladder tumor he is gotten cardiac clearance to hold his Eliquis for 48 hours.    Description of procedure: Patient defined holding or taken procedure placed in dorsolithotomy position patient generated prepped radial fashion a 22 Wallisian cystoscopy she was used per urethra causation right posterior lateral wall above the right ureteral orifice was the papillary tumor it was over an area about at least 4 cm no thin stalk but was papillary and exophytic no other tumors lesions seen in the bladder and moderate trabeculations.    This point a the cystoscope was removed 24 Wallisian resectoscope sheath was then introduced per urethra under direct visualization using 24 cutting loop as a way to resect the tumor all the way down to the base with good resection in terms of muscle and and in depth.

## 2025-01-02 LAB — SURGICAL PATHOLOGY REPORT: NORMAL

## 2025-01-18 ENCOUNTER — APPOINTMENT (OUTPATIENT)
Dept: GENERAL RADIOLOGY | Age: 80
End: 2025-01-18
Payer: MEDICARE

## 2025-01-18 ENCOUNTER — HOSPITAL ENCOUNTER (EMERGENCY)
Age: 80
Discharge: HOME | End: 2025-01-19
Attending: EMERGENCY MEDICINE
Payer: MEDICARE

## 2025-01-18 VITALS
DIASTOLIC BLOOD PRESSURE: 77 MMHG | TEMPERATURE: 97.4 F | SYSTOLIC BLOOD PRESSURE: 130 MMHG | RESPIRATION RATE: 12 BRPM | OXYGEN SATURATION: 94 % | HEART RATE: 82 BPM

## 2025-01-18 DIAGNOSIS — S42.212A CLOSED FRACTURE OF NECK OF LEFT HUMERUS, INITIAL ENCOUNTER: Primary | ICD-10-CM

## 2025-01-18 PROCEDURE — 6360000002 HC RX W HCPCS: Performed by: EMERGENCY MEDICINE

## 2025-01-18 PROCEDURE — 99284 EMERGENCY DEPT VISIT MOD MDM: CPT

## 2025-01-18 PROCEDURE — 96374 THER/PROPH/DIAG INJ IV PUSH: CPT

## 2025-01-18 PROCEDURE — 73030 X-RAY EXAM OF SHOULDER: CPT

## 2025-01-18 RX ORDER — ONDANSETRON 4 MG/1
4 TABLET, ORALLY DISINTEGRATING ORAL 3 TIMES DAILY PRN
Qty: 21 TABLET | Refills: 0 | Status: SHIPPED | OUTPATIENT
Start: 2025-01-18

## 2025-01-18 RX ORDER — 0.9 % SODIUM CHLORIDE 0.9 %
1000 INTRAVENOUS SOLUTION INTRAVENOUS ONCE
Status: DISCONTINUED | OUTPATIENT
Start: 2025-01-18 | End: 2025-01-18

## 2025-01-18 RX ORDER — FENTANYL CITRATE 50 UG/ML
25 INJECTION, SOLUTION INTRAMUSCULAR; INTRAVENOUS
Status: DISCONTINUED | OUTPATIENT
Start: 2025-01-18 | End: 2025-01-20 | Stop reason: HOSPADM

## 2025-01-18 RX ORDER — LIDOCAINE 4 G/G
1 PATCH TOPICAL DAILY
Qty: 30 PATCH | Refills: 0 | Status: SHIPPED | OUTPATIENT
Start: 2025-01-18 | End: 2025-02-17

## 2025-01-18 RX ORDER — HYDROCODONE BITARTRATE AND ACETAMINOPHEN 5; 325 MG/1; MG/1
1-2 TABLET ORAL EVERY 6 HOURS PRN
Qty: 10 TABLET | Refills: 0 | Status: SHIPPED | OUTPATIENT
Start: 2025-01-18 | End: 2025-01-20

## 2025-01-18 RX ORDER — ONDANSETRON 2 MG/ML
4 INJECTION INTRAMUSCULAR; INTRAVENOUS EVERY 30 MIN PRN
Status: DISCONTINUED | OUTPATIENT
Start: 2025-01-18 | End: 2025-01-20 | Stop reason: HOSPADM

## 2025-01-18 RX ADMIN — FENTANYL CITRATE 25 MCG: 50 INJECTION, SOLUTION INTRAMUSCULAR; INTRAVENOUS at 11:06

## 2025-01-18 ASSESSMENT — PAIN SCALES - GENERAL: PAINLEVEL_OUTOF10: 7

## 2025-01-18 ASSESSMENT — PAIN DESCRIPTION - ORIENTATION: ORIENTATION: LEFT

## 2025-01-18 ASSESSMENT — PAIN DESCRIPTION - LOCATION: LOCATION: SHOULDER

## 2025-01-18 ASSESSMENT — PAIN DESCRIPTION - DESCRIPTORS: DESCRIPTORS: THROBBING

## 2025-01-18 ASSESSMENT — PAIN - FUNCTIONAL ASSESSMENT: PAIN_FUNCTIONAL_ASSESSMENT: 0-10

## 2025-01-18 NOTE — ED PROVIDER NOTES
[facility]      TRIAGE CHIEF COMPLAINT:   Shoulder Injury (Pt presents to the ED with complaints of a shoulder injury. Pt states that he was putting salt down on his driveway when he fell on his left shoulder. Pt was outside on the ground for an hour prior to anyone noticing. )      [place]:  Pk Lang is a 79 y.o. male that presents with complaint of left shoulder pain..  Patient has a history of left shoulder dislocation many times he states today prior to arrival he was outside putting salt on the driveway when he slipped and fell on the ice and injured his left shoulder.  Could not get up for the past hour.  Brought in by EMS.  No other injuries or questions or concerns he is on Eliquis did not hit his head on a pass out no chest pain shortness of breath no back pain or neck pain no other extremity pain just left shoulder pain.  He is worried about dislocation.,  He is right-handed.    REVIEW OF SYSTEMS:  At least 10 systems reviewed and otherwise acutely negative except as in the [place].    Review of Systems   Constitutional: Negative.    HENT: Negative.     Eyes: Negative.    Respiratory: Negative.     Cardiovascular: Negative.    Gastrointestinal: Negative.    Endocrine: Negative.    Genitourinary: Negative.    Musculoskeletal:  Positive for arthralgias and myalgias.   Skin: Negative.    Allergic/Immunologic: Negative.    Neurological: Negative.    Hematological: Negative.    Psychiatric/Behavioral: Negative.     All other systems reviewed and are negative.      Past Medical History:   Diagnosis Date    Abdominal ultrasound 12/15/2021    Normal study    BPH (benign prostatic hyperplasia) 03/13/2021    Diabetes mellitus (HCC)     History of Doppler ultrasound 07/26/2018    arterial duplex-no significant disease    History of exercise stress test 11/30/2017    treadmill    Hx of blood clots     DVT's    Hx of Doppler echocardiogram 11/30/2017    EF50-60%,normal    Hx of Doppler

## 2025-02-04 RX ORDER — ASPIRIN 81 MG/1
81 TABLET, COATED ORAL DAILY
Qty: 90 TABLET | Refills: 1 | OUTPATIENT
Start: 2025-02-04

## 2025-02-04 RX ORDER — ASPIRIN 81 MG/1
81 TABLET ORAL DAILY
Qty: 30 TABLET | Refills: 5 | Status: SHIPPED | OUTPATIENT
Start: 2025-02-04

## 2025-02-24 ENCOUNTER — TELEPHONE (OUTPATIENT)
Dept: CARDIOLOGY CLINIC | Age: 80
End: 2025-02-24

## 2025-02-24 NOTE — TELEPHONE ENCOUNTER
Cardiologist: Dr. Nguyen  Surgeon: Dr. Paul  Surgery: TURB  w/cysview, Bilat retrograde pyelgrams  Surgery/Procedure should be done in the hospital setting    _____ yes    _____  no    Anesthesia: General  Date: 3/17/2025  Fax# [phone]  #  [phone]    Last OV 12/9/2024 w/Juan     CAD (coronary artery disease)  -PCI of LAD in 2017. LHC 8/10/23 showed patent stent.      Primary and secondary prevention discussed with patient:              -Low sodium cardiac diet              -exercise 30 min a day three days a week  Continue current medications lisinopril, Lipitor, Toprol, ASA     Dyslipidemia  -At or near goal Yes, LDL 38. A1C is 7.3. Continue with lipitor 40 mg.  Hepatic function panel WNL. No abdominal pain. No myalgias.      Hypertension  -Stable, continue lisinopril 10 mg daily and C/O fatigue with bradycardia, reduce dose of Toprol-XL to  12.5 mg daily.     H/O deep venous thrombosis  -Extensive DVT of left lower extremity found 9/24/20 while hospitalized for COVID-19 infection.  Patient had several follow-up ultrasounds following anticoagulation therapy.  Most recent ultrasound in 2021 showed complete resolution of DVT.        Last EKG- 9/5/2024        NM- 7/24/2023  abnormal stress test, normal LVEF, normal EDV, Myocardial perfusion scan   shows moderate size,severe intensity, partially reversible perfusion defect   in tosha-apical wall.        Echo- 11/14/2023  Contrast used: Definity.    Left Ventricle: Normal left ventricular systolic function with a visually estimated EF of 55 - 60%.  Mild apical hypokinesis and a very small area correlate with her LAD MI in the past    No significant valvular disease noted.    Pericardium: No pericardial effusion.    Interatrial Septum: Inadeqaute bubble study; color doppler does not suggest PFO or ASD.        Cath- 8/10/2023   patent LAD stent, normal rest of coronaries        Eliquis     Aspirin

## 2025-03-13 ENCOUNTER — HOSPITAL ENCOUNTER (OUTPATIENT)
Age: 80
Setting detail: SPECIMEN
Discharge: HOME | End: 2025-03-13
Payer: MEDICARE

## 2025-03-13 PROCEDURE — 87086 URINE CULTURE/COLONY COUNT: CPT

## 2025-03-14 LAB
MICROORGANISM SPEC CULT: NORMAL
SPECIMEN DESCRIPTION: NORMAL

## 2025-03-18 LAB — NON-GYN CYTOLOGY REPORT: NORMAL

## 2025-05-19 RX ORDER — ATORVASTATIN CALCIUM 40 MG/1
40 TABLET, FILM COATED ORAL DAILY
Qty: 90 TABLET | Refills: 1 | Status: SHIPPED | OUTPATIENT
Start: 2025-05-19

## 2025-06-12 NOTE — PROGRESS NOTES
Other Type of Procedure:   Cardiac Clearance  ([facility])   Needs Further Workup Not Cleared Cleared ([facility] risk below)     Low Risk     Moderate Risk     High Risk   Additional Notes:   Consult/ Referral   CT Surgery Electrophysiology TAVR Coordination (Jeanne Peabody) [facility] Venous Ablation Consult Watchman Consult  ([facility] Physician Below)        Daisy Mcdonough   Additional Notes:   Labs Date Last Completed Date Expected Retrieve from outside source?    BMP       BNP       CBC       INR       Lipid       TSH       A1C       Other:      Additional Notes:   Medication/Samples Type of Medication Additional Notes    Samples of      Paper script      Research Study     Other:   Next Office Visit  ([facility])   1 month 3 months 6 months 9 months 1 year PRN 1 week 2 weeks   Additional Notes:

## 2025-06-19 ENCOUNTER — OFFICE VISIT (OUTPATIENT)
Dept: CARDIOLOGY CLINIC | Age: 80
End: 2025-06-19
Payer: MEDICARE

## 2025-06-19 VITALS
HEIGHT: 68 IN | DIASTOLIC BLOOD PRESSURE: 60 MMHG | HEART RATE: 80 BPM | SYSTOLIC BLOOD PRESSURE: 118 MMHG | WEIGHT: 184 LBS | BODY MASS INDEX: 27.89 KG/M2

## 2025-06-19 DIAGNOSIS — E78.5 DYSLIPIDEMIA: ICD-10-CM

## 2025-06-19 DIAGNOSIS — I25.10 CORONARY ARTERY DISEASE INVOLVING NATIVE CORONARY ARTERY OF NATIVE HEART WITHOUT ANGINA PECTORIS: Primary | ICD-10-CM

## 2025-06-19 DIAGNOSIS — I10 PRIMARY HYPERTENSION: ICD-10-CM

## 2025-06-19 DIAGNOSIS — Z86.718 H/O DEEP VENOUS THROMBOSIS: ICD-10-CM

## 2025-06-19 PROCEDURE — 1159F MED LIST DOCD IN RCRD: CPT | Performed by: NURSE PRACTITIONER

## 2025-06-19 PROCEDURE — 3074F SYST BP LT 130 MM HG: CPT | Performed by: NURSE PRACTITIONER

## 2025-06-19 PROCEDURE — 99214 OFFICE O/P EST MOD 30 MIN: CPT | Performed by: NURSE PRACTITIONER

## 2025-06-19 PROCEDURE — 1123F ACP DISCUSS/DSCN MKR DOCD: CPT | Performed by: NURSE PRACTITIONER

## 2025-06-19 PROCEDURE — 3078F DIAST BP <80 MM HG: CPT | Performed by: NURSE PRACTITIONER

## 2025-06-19 RX ORDER — ASPIRIN 81 MG/1
81 TABLET ORAL DAILY
Qty: 30 TABLET | Refills: 5 | Status: SHIPPED | OUTPATIENT
Start: 2025-06-19

## 2025-06-19 RX ORDER — ATORVASTATIN CALCIUM 40 MG/1
40 TABLET, FILM COATED ORAL DAILY
Qty: 90 TABLET | Refills: 1 | Status: SHIPPED | OUTPATIENT
Start: 2025-06-19

## 2025-06-19 RX ORDER — METOPROLOL SUCCINATE 25 MG/1
25 TABLET, EXTENDED RELEASE ORAL DAILY
Qty: 90 TABLET | Refills: 3 | Status: SHIPPED | OUTPATIENT
Start: 2025-06-19

## 2025-06-19 NOTE — PROGRESS NOTES
[place] [address]  Phone: [phone]    Fax [phone]    Sommer Segovia MD, [facility]  Simon Polk MD, [facility]   Jam Garcia MD, [facility] MD Brii Jorgensen MD, [facility]  Héctor Mcdonough MD, [facility]    Michael Romo MD, [facility]  Myrtle Gupta MD, [facility]  Leela Orourke, APRN  Ellen Ibarra, APRN  Floridalma Robertson, APRN  Juan Hagen, APRN      Cardiology Progress Note      6/19/2025    RE: Pk Lang  (1945)                             Primary cardiologist: Dr. Michelle Nguyen       Subjective:  CC:   1. Coronary artery disease involving native coronary artery of native heart without angina pectoris    2. Primary hypertension    3. Dyslipidemia    4. H/O deep venous thrombosis              HPI: Pk Lang, who is a  79 y.o. year old male with a past medical history as listed below.  Patient presents to the office for follow up on CAD (PCI of LAD in 2017), HTN, H/O DVT, and hyperlipidemia. Patient is  an active male who walks regularly. Patient is  compliant with medications.  Patient denies any chest pain, shortness of breath, dizziness, syncope, or palpitations.    Past Medical History:   Diagnosis Date    Abdominal ultrasound 12/15/2021    Normal study    BPH (benign prostatic hyperplasia) 03/13/2021    Diabetes mellitus (HCC)     History of Doppler ultrasound 07/26/2018    arterial duplex-no significant disease    History of exercise stress test 11/30/2017    treadmill    Hx of blood clots     DVT's    Hx of Doppler echocardiogram 11/30/2017    EF50-60%,normal    Hx of Doppler ultrasound 06/22/2022    Evidence of partially occlusive chronic DVT in the left proximal CFV, PFV. Proximal FV, and popliteal V, and partially occlusive chronic SVT in the left GSV SFJ, GSV mid thigh,and distal SSV. Occlusive chronic DVT in the let mid and distal FV and occlusive chronic SVT in the left GSV knee and mid calf. Possible chronic, onon

## 2025-06-19 NOTE — PROGRESS NOTES
CLINICAL STAFF DOCUMENTATION    Juan Hagen CNP     Pk Richardsonmorena  1945  [id number]    Have you had any Chest Pain recently? - No  Have you had any Shortness of Breath - No  Have you had any dizziness - No  Have you had any palpitations recently? - No  Do you have any edema - swelling in No    When did you have your last labs drawn 3/25  What doctor ordered kamal  Do we have the labs in their chart Yes  Do you need any prescriptions refilled? - No  Do you have a surgery or procedure scheduled in the near future - No  Do use tobacco products? - No  Do you drink alcohol? - Yes  Do you use any illicit drugs? - No  Caffeine? - Yes  How much caffeine? .2  cups       Check medication list thoroughly!!! AND RECONCILE OUTSIDE MEDICATIONS  If dose has changed change the entire order not just the MG  BE SURE TO ASK PATIENT IF THEY NEED MEDICATION REFILLS  Verify Pharmacy and update if incorrect    Add to every patient's \"wrap up\" the following dot phrase AFTERVISITCARDIOHEARTHOUSE and ensure we explain this to our patients

## (undated) DEVICE — GOWN,SIRUS,FABRNF,RAGLAN,L,ST,30/CS: Brand: MEDLINE

## (undated) DEVICE — JELLY,LUBE,STERILE,FLIP TOP,TUBE,2-OZ: Brand: MEDLINE

## (undated) DEVICE — MARKER SURG SKIN UTIL REGULAR/FINE 2 TIP W/ RUL AND 9 LBL

## (undated) DEVICE — Device

## (undated) DEVICE — Z INACTIVE USE 2660663 SOLUTION IRRIG 1000ML STRIL H2O USP PLAS POUR BTL

## (undated) DEVICE — LINER,SEMI-RIGID,3000CC,50EA/CS: Brand: MEDLINE

## (undated) DEVICE — CONTAINER,SPECIMEN,OR STERILE,4OZ: Brand: MEDLINE

## (undated) DEVICE — BLADE CLIPPER GEN PURP NS

## (undated) DEVICE — TRAY PREP DRY W/ PREM GLV 2 APPL 6 SPNG 2 UNDPD 1 OVERWRAP

## (undated) DEVICE — Z INACTIVE USE 2635503 SOLUTION IRRIG 3000ML ST H2O USP UROMATIC PLAS CONT

## (undated) DEVICE — CATHETER,FOLEY,100%SILICONE,20FR,10ML,LF: Brand: MEDLINE

## (undated) DEVICE — DBD-PACK,CYSTOSCOPY,PK VI,AURORA: Brand: MEDLINE

## (undated) DEVICE — TUBING, SUCTION, 9/32" X 10', STRAIGHT: Brand: MEDLINE

## (undated) DEVICE — MAT ABSRB W28XL48IN C6L FLR ULT W POLY BK

## (undated) DEVICE — GOWN,ECLIPSE,POLYRNF,BRTHSLV,L,30/CS: Brand: MEDLINE

## (undated) DEVICE — ELECTRODE ES AD CRDLSS PT RET REM POLYHESIVE

## (undated) DEVICE — SINGLE PORT MANIFOLD: Brand: NEPTUNE 2

## (undated) DEVICE — ELECTRODE ROLL BALL ANGL 24 26FR

## (undated) DEVICE — GOWN,SIRUS,POLYRNF,BRTHSLV,XLN/XL,20/CS: Brand: MEDLINE